# Patient Record
Sex: FEMALE | Race: BLACK OR AFRICAN AMERICAN | NOT HISPANIC OR LATINO | URBAN - METROPOLITAN AREA
[De-identification: names, ages, dates, MRNs, and addresses within clinical notes are randomized per-mention and may not be internally consistent; named-entity substitution may affect disease eponyms.]

---

## 2018-03-01 ENCOUNTER — OUTPATIENT (OUTPATIENT)
Dept: OUTPATIENT SERVICES | Facility: HOSPITAL | Age: 38
LOS: 1 days | Discharge: HOME | End: 2018-03-01

## 2018-03-01 DIAGNOSIS — N97.9 FEMALE INFERTILITY, UNSPECIFIED: ICD-10-CM

## 2018-06-14 ENCOUNTER — OUTPATIENT (OUTPATIENT)
Dept: OUTPATIENT SERVICES | Facility: HOSPITAL | Age: 38
LOS: 1 days | Discharge: HOME | End: 2018-06-14

## 2018-06-14 DIAGNOSIS — N97.9 FEMALE INFERTILITY, UNSPECIFIED: ICD-10-CM

## 2022-09-17 ENCOUNTER — EMERGENCY (EMERGENCY)
Facility: HOSPITAL | Age: 42
LOS: 0 days | Discharge: HOME | End: 2022-09-17
Attending: EMERGENCY MEDICINE | Admitting: EMERGENCY MEDICINE

## 2022-09-17 VITALS
SYSTOLIC BLOOD PRESSURE: 114 MMHG | DIASTOLIC BLOOD PRESSURE: 71 MMHG | OXYGEN SATURATION: 100 % | RESPIRATION RATE: 16 BRPM | HEART RATE: 81 BPM

## 2022-09-17 VITALS
HEART RATE: 92 BPM | OXYGEN SATURATION: 100 % | HEIGHT: 67 IN | WEIGHT: 149.91 LBS | RESPIRATION RATE: 18 BRPM | SYSTOLIC BLOOD PRESSURE: 124 MMHG | TEMPERATURE: 98 F | DIASTOLIC BLOOD PRESSURE: 94 MMHG

## 2022-09-17 DIAGNOSIS — R19.7 DIARRHEA, UNSPECIFIED: ICD-10-CM

## 2022-09-17 DIAGNOSIS — Z88.5 ALLERGY STATUS TO NARCOTIC AGENT: ICD-10-CM

## 2022-09-17 DIAGNOSIS — D25.9 LEIOMYOMA OF UTERUS, UNSPECIFIED: ICD-10-CM

## 2022-09-17 DIAGNOSIS — R10.30 LOWER ABDOMINAL PAIN, UNSPECIFIED: ICD-10-CM

## 2022-09-17 DIAGNOSIS — N83.202 UNSPECIFIED OVARIAN CYST, LEFT SIDE: ICD-10-CM

## 2022-09-17 DIAGNOSIS — Z91.018 ALLERGY TO OTHER FOODS: ICD-10-CM

## 2022-09-17 LAB
ALBUMIN SERPL ELPH-MCNC: 4.8 G/DL — SIGNIFICANT CHANGE UP (ref 3.5–5.2)
ALP SERPL-CCNC: 76 U/L — SIGNIFICANT CHANGE UP (ref 30–115)
ALT FLD-CCNC: 11 U/L — SIGNIFICANT CHANGE UP (ref 0–41)
ANION GAP SERPL CALC-SCNC: 10 MMOL/L — SIGNIFICANT CHANGE UP (ref 7–14)
APPEARANCE UR: CLEAR — SIGNIFICANT CHANGE UP
AST SERPL-CCNC: 22 U/L — SIGNIFICANT CHANGE UP (ref 0–41)
BASOPHILS # BLD AUTO: 0.02 K/UL — SIGNIFICANT CHANGE UP (ref 0–0.2)
BASOPHILS NFR BLD AUTO: 0.3 % — SIGNIFICANT CHANGE UP (ref 0–1)
BILIRUB SERPL-MCNC: <0.2 MG/DL — SIGNIFICANT CHANGE UP (ref 0.2–1.2)
BILIRUB UR-MCNC: NEGATIVE — SIGNIFICANT CHANGE UP
BUN SERPL-MCNC: 10 MG/DL — SIGNIFICANT CHANGE UP (ref 10–20)
CALCIUM SERPL-MCNC: 10.3 MG/DL — SIGNIFICANT CHANGE UP (ref 8.4–10.5)
CHLORIDE SERPL-SCNC: 102 MMOL/L — SIGNIFICANT CHANGE UP (ref 98–110)
CO2 SERPL-SCNC: 24 MMOL/L — SIGNIFICANT CHANGE UP (ref 17–32)
COLOR SPEC: YELLOW — SIGNIFICANT CHANGE UP
CREAT SERPL-MCNC: 0.8 MG/DL — SIGNIFICANT CHANGE UP (ref 0.7–1.5)
DIFF PNL FLD: NEGATIVE — SIGNIFICANT CHANGE UP
EGFR: 95 ML/MIN/1.73M2 — SIGNIFICANT CHANGE UP
EOSINOPHIL # BLD AUTO: 0.09 K/UL — SIGNIFICANT CHANGE UP (ref 0–0.7)
EOSINOPHIL NFR BLD AUTO: 1.4 % — SIGNIFICANT CHANGE UP (ref 0–8)
GLUCOSE SERPL-MCNC: 89 MG/DL — SIGNIFICANT CHANGE UP (ref 70–99)
GLUCOSE UR QL: NEGATIVE MG/DL — SIGNIFICANT CHANGE UP
HCG SERPL QL: NEGATIVE — SIGNIFICANT CHANGE UP
HCT VFR BLD CALC: 40.7 % — SIGNIFICANT CHANGE UP (ref 37–47)
HGB BLD-MCNC: 13.3 G/DL — SIGNIFICANT CHANGE UP (ref 12–16)
IMM GRANULOCYTES NFR BLD AUTO: 0.2 % — SIGNIFICANT CHANGE UP (ref 0.1–0.3)
KETONES UR-MCNC: NEGATIVE — SIGNIFICANT CHANGE UP
LACTATE SERPL-SCNC: 0.9 MMOL/L — SIGNIFICANT CHANGE UP (ref 0.7–2)
LEUKOCYTE ESTERASE UR-ACNC: NEGATIVE — SIGNIFICANT CHANGE UP
LG PLATELETS BLD QL AUTO: SLIGHT — SIGNIFICANT CHANGE UP
LIDOCAIN IGE QN: 56 U/L — SIGNIFICANT CHANGE UP (ref 7–60)
LYMPHOCYTES # BLD AUTO: 1.62 K/UL — SIGNIFICANT CHANGE UP (ref 1.2–3.4)
LYMPHOCYTES # BLD AUTO: 25.6 % — SIGNIFICANT CHANGE UP (ref 20.5–51.1)
MCHC RBC-ENTMCNC: 28.2 PG — SIGNIFICANT CHANGE UP (ref 27–31)
MCHC RBC-ENTMCNC: 32.7 G/DL — SIGNIFICANT CHANGE UP (ref 32–37)
MCV RBC AUTO: 86.4 FL — SIGNIFICANT CHANGE UP (ref 81–99)
MONOCYTES # BLD AUTO: 0.63 K/UL — HIGH (ref 0.1–0.6)
MONOCYTES NFR BLD AUTO: 10 % — HIGH (ref 1.7–9.3)
NEUTROPHILS # BLD AUTO: 3.96 K/UL — SIGNIFICANT CHANGE UP (ref 1.4–6.5)
NEUTROPHILS NFR BLD AUTO: 62.5 % — SIGNIFICANT CHANGE UP (ref 42.2–75.2)
NEUTS BAND # BLD: 1 % — SIGNIFICANT CHANGE UP (ref 0–6)
NITRITE UR-MCNC: NEGATIVE — SIGNIFICANT CHANGE UP
NRBC # BLD: 0 /100 WBCS — SIGNIFICANT CHANGE UP (ref 0–0)
NRBC # BLD: 0 /100 — SIGNIFICANT CHANGE UP (ref 0–0)
PH UR: 6 — SIGNIFICANT CHANGE UP (ref 5–8)
PLAT MORPH BLD: NORMAL — SIGNIFICANT CHANGE UP
PLATELET # BLD AUTO: 311 K/UL — SIGNIFICANT CHANGE UP (ref 130–400)
POTASSIUM SERPL-MCNC: 5.2 MMOL/L — HIGH (ref 3.5–5)
POTASSIUM SERPL-SCNC: 5.2 MMOL/L — HIGH (ref 3.5–5)
PROT SERPL-MCNC: 8.2 G/DL — HIGH (ref 6–8)
PROT UR-MCNC: NEGATIVE MG/DL — SIGNIFICANT CHANGE UP
RBC # BLD: 4.71 M/UL — SIGNIFICANT CHANGE UP (ref 4.2–5.4)
RBC # FLD: 13.3 % — SIGNIFICANT CHANGE UP (ref 11.5–14.5)
RBC BLD AUTO: NORMAL — SIGNIFICANT CHANGE UP
SODIUM SERPL-SCNC: 136 MMOL/L — SIGNIFICANT CHANGE UP (ref 135–146)
SP GR SPEC: 1.02 — SIGNIFICANT CHANGE UP (ref 1.01–1.03)
SPHEROCYTES BLD QL SMEAR: SLIGHT — SIGNIFICANT CHANGE UP
UROBILINOGEN FLD QL: 0.2 MG/DL — SIGNIFICANT CHANGE UP
VARIANT LYMPHS # BLD: 6 % — HIGH (ref 0–5)
WBC # BLD: 6.33 K/UL — SIGNIFICANT CHANGE UP (ref 4.8–10.8)
WBC # FLD AUTO: 6.33 K/UL — SIGNIFICANT CHANGE UP (ref 4.8–10.8)

## 2022-09-17 PROCEDURE — 74177 CT ABD & PELVIS W/CONTRAST: CPT | Mod: 26,MA

## 2022-09-17 PROCEDURE — 76830 TRANSVAGINAL US NON-OB: CPT | Mod: 26

## 2022-09-17 PROCEDURE — 99285 EMERGENCY DEPT VISIT HI MDM: CPT

## 2022-09-17 PROCEDURE — 71045 X-RAY EXAM CHEST 1 VIEW: CPT | Mod: 26

## 2022-09-17 RX ORDER — ONDANSETRON 8 MG/1
4 TABLET, FILM COATED ORAL ONCE
Refills: 0 | Status: COMPLETED | OUTPATIENT
Start: 2022-09-17 | End: 2022-09-17

## 2022-09-17 RX ORDER — SODIUM CHLORIDE 9 MG/ML
1000 INJECTION INTRAMUSCULAR; INTRAVENOUS; SUBCUTANEOUS ONCE
Refills: 0 | Status: COMPLETED | OUTPATIENT
Start: 2022-09-17 | End: 2022-09-17

## 2022-09-17 RX ADMIN — ONDANSETRON 4 MILLIGRAM(S): 8 TABLET, FILM COATED ORAL at 16:54

## 2022-09-17 RX ADMIN — SODIUM CHLORIDE 1000 MILLILITER(S): 9 INJECTION INTRAMUSCULAR; INTRAVENOUS; SUBCUTANEOUS at 16:54

## 2022-09-17 NOTE — ED PROVIDER NOTE - NS ED ATTENDING STATEMENT MOD
This was a shared visit with the KRAIG. I reviewed and verified the documentation and independently performed the documented:

## 2022-09-17 NOTE — ED PROVIDER NOTE - ATTENDING APP SHARED VISIT CONTRIBUTION OF CARE
42-year-old female with history of for evaluation of lower abdominal pain/cramping.  Patient states that symptoms are associated with diarrhea for the past 4 days.  Took Pepto, now also noticing black stools.  Went to urgent care, sent in for further evaluation.  Well appearing, NAD, non toxic. NCAT PERRLA EOMI neck supple non tender normal wob cta bl rrr abdomen s mild diffuse tenderness palpation nd no rebound no guarding WWPx4 neuro non focal

## 2022-09-17 NOTE — ED PROVIDER NOTE - NSFOLLOWUPINSTRUCTIONS_ED_ALL_ED_FT
Diarrhea    Diarrhea is frequent loose and watery bowel movements that has many causes. Diarrhea can make you feel weak and cause you to become dehydrated. Diarrhea typically lasts 2–3 days. However, it can last longer if it is a sign of something more serious. Drink clear fluids to prevent dehydration. Eat bland, easy-to-digest foods as tolerated.     SEEK IMMEDIATE MEDICAL CARE IF YOU HAVE THE FOLLOWING SYMPTOMS: fever, lightheadedness/dizziness, chest pain, black or bloody stools, shortness of breath, severe abdominal or back pain, or any signs of dehydration.     Ovarian Cyst    An ovarian cyst is a fluid-filled sac that forms on an ovary. The ovaries are small organs that produce eggs in women. Various types of cysts can form on the ovaries. Most are not cancerous. Many do not cause problems, and they often go away on their own. Some may cause symptoms and require treatment. Common types of ovarian cysts include:     Functional cysts—These cysts may occur every month during the menstrual cycle. This is normal. The cysts usually go away with the next menstrual cycle if the woman does not get pregnant. Usually, there are no symptoms with a functional cyst.  Endometrioma cysts—These cysts form from the tissue that lines the uterus. They are also called "chocolate cysts" because they become filled with blood that turns brown. This type of cyst can cause pain in the lower abdomen during intercourse and with your menstrual period.  Cystadenoma cysts—This type develops from the cells on the outside of the ovary. These cysts can get very big and cause lower abdomen pain and pain with intercourse. This type of cyst can twist on itself, cut off its blood supply, and cause severe pain. It can also easily rupture and cause a lot of pain.  Dermoid cysts—This type of cyst is sometimes found in both ovaries. These cysts may contain different kinds of body tissue, such as skin, teeth, hair, or cartilage. They usually do not cause symptoms unless they get very big.   Theca lutein cysts—These cysts occur when too much of a certain hormone (human chorionic gonadotropin) is produced and overstimulates the ovaries to produce an egg. This is most common after procedures used to assist with the conception of a baby (in vitro fertilization).    CAUSES  Fertility drugs can cause a condition in which multiple large cysts are formed on the ovaries. This is called ovarian hyperstimulation syndrome.   A condition called polycystic ovary syndrome can cause hormonal imbalances that can lead to nonfunctional ovarian cysts.     SIGNS AND SYMPTOMS  Many ovarian cysts do not cause symptoms. If symptoms are present, they may include:    Pelvic pain or pressure.  Pain in the lower abdomen.  Pain during sexual intercourse.  Increasing girth (swelling) of the abdomen.  Abnormal menstrual periods.  Increasing pain with menstrual periods.  Stopping having menstrual periods without being pregnant.    DIAGNOSIS  These cysts are commonly found during a routine or annual pelvic exam. Tests may be ordered to find out more about the cyst. These tests may include:    Ultrasound.  X-ray of the pelvis.  CT scan.  MRI.  Blood tests.    TREATMENT  Many ovarian cysts go away on their own without treatment. Your health care provider may want to check your cyst regularly for 2–3 months to see if it changes. For women in menopause, it is particularly important to monitor a cyst closely because of the higher rate of ovarian cancer in menopausal women. When treatment is needed, it may include any of the following:    A procedure to drain the cyst (aspiration). This may be done using a long needle and ultrasound. It can also be done through a laparoscopic procedure. This involves using a thin, lighted tube with a tiny camera on the end (laparoscope) inserted through a small incision.   Surgery to remove the whole cyst. This may be done using laparoscopic surgery or an open surgery involving a larger incision in the lower abdomen.   Hormone treatment or birth control pills. These methods are sometimes used to help dissolve a cyst.    HOME CARE INSTRUCTIONS  Only take over-the-counter or prescription medicines as directed by your health care provider.   Follow up with your health care provider as directed.   Get regular pelvic exams and Pap tests.    SEEK MEDICAL CARE IF:  Your periods are late, irregular, or painful, or they stop.  Your pelvic pain or abdominal pain does not go away.  Your abdomen becomes larger or swollen.  You have pressure on your bladder or trouble emptying your bladder completely.  You have pain during sexual intercourse.  You have feelings of fullness, pressure, or discomfort in your stomach.  You lose weight for no apparent reason.  You feel generally ill.  You become constipated.  You lose your appetite.  You develop acne.  You have an increase in body and facial hair.  You are gaining weight, without changing your exercise and eating habits.  You think you are pregnant.    SEEK IMMEDIATE MEDICAL CARE IF:  You have increasing abdominal pain.  You feel sick to your stomach (nauseous), and you throw up (vomit).  You develop a fever that comes on suddenly.  You have abdominal pain during a bowel movement.  Your menstrual periods become heavier than usual.

## 2022-09-17 NOTE — ED PROVIDER NOTE - OBJECTIVE STATEMENT
43 yo F pmhx uterine fibroids presenting to the ED for evaluation of lower abdominal cramping discomfort with diarrhea x 4 days. pt reports she was taking pepto bismol with minimal relief, reporting dark stools since taking pepto bismol. pt states symptoms are intermittent sharp. denies any modifying factors. denies fever, chills, brbpr, nausea, vomiting, chest pain, sob, dysuria, hematuria, flank pain.

## 2022-09-17 NOTE — ED PROVIDER NOTE - PATIENT PORTAL LINK FT
You can access the FollowMyHealth Patient Portal offered by [facility] by registering at the following website: [email or web address]. By joining [facility]’s FollowMyHealth portal, you will also be able to view your health information using other applications (apps) compatible with our system.

## 2022-09-17 NOTE — ED PROVIDER NOTE - PROGRESS NOTE DETAILS
Patient is aware of her enlarged ovary with ovarian cyst.  Patient is having further work-up with her gynecologist on Tuesday.

## 2022-09-17 NOTE — ED PROVIDER NOTE - CLINICAL SUMMARY MEDICAL DECISION MAKING FREE TEXT BOX
Pt evaluated after sign out. Comfortable. CT/US reviewed. Pt has no pain right now. No concern for torsion. Will d/c with PMD/GYN follow up.

## 2022-09-17 NOTE — ED PROVIDER NOTE - PHYSICAL EXAMINATION
GENERAL: Well-nourished, Well-developed. NAD.  HEAD: No visible or palpable bumps or hematomas. No ecchymosis behind ears B/L.  Eyes: PERRLA, EOMI. No asymmetry. No nystagmus. No conjunctival injection. Non-icteric sclera.  ENMT: MMM.   Neck: Supple. FROM  CVS: No JVD. No reproducible chest wall tenderness. Normal S1,S2. No murmurs appreciated on auscultation   RESP: No use of accessory muscles. Chest rise symmetrical with good expansion. Lungs clear to auscultation B/L. No wheezing, rales, or rhonchi auscultated.  GI: Normal auscultation of bowel sounds in all 4 quadrants. (+)mild lower abd ttp. Soft, Nondistended. No guarding or rebound tenderness. No CVAT B/L.  Skin: Warm, Dry. No rashes or lesions. Good cap refill < 2 sec B/L.  EXT: Radial and pedal pulses present B/L. No calf tenderness or swelling B/L. No palpable cords. No pedal edema B/L.

## 2022-09-19 LAB
CULTURE RESULTS: SIGNIFICANT CHANGE UP
SPECIMEN SOURCE: SIGNIFICANT CHANGE UP

## 2023-08-03 NOTE — ED PROVIDER NOTE - NS ED ROS FT
PATIENT INFORMATION    Anticipatory guidance discussed  Bicycle helmets  Chores and other responsibilities  Discipline issues: limit-setting, positive reinforcement  Fluoride supplementation if unfluoridated water supply  Importance of regular dental care  Importance of regular exercise  Importance of varied diet  Library card; limit TV, media violence  Minimize junk food  Seat belts; don't put in front seat  Skim or lowfat milk best  Smoke detectors; home fire drills  Teach child how to deal with strangers  Teaching pedestrian safety    Follow-Up  - Return for your yearly well child visit.    7-8 years old Health and Safety Tips - The following hyperlinks are available to access via [employer]    Parent Education from [employer]    Educación para padres sobre niños sanos    Additional Educational Resources:  For additional resources regarding your symptoms, diagnosis, or further health information, please visit the Discover a Healthier You section on [email or web address] or the Online Health Resources section in [employer].  
Constitutional: No fever, chills.  Eyes:  No visual changes  ENMT:  No neck pain  Cardiac:  No chest pain  Respiratory:  No cough, SOB  GI:  (+)abdominal pain and diarrhea. No nausea, vomiting  :  No dysuria, hematuria  MS:  No back pain.  Neuro:  No headache or lightheadedness  Skin:  No skin rash  Endocrine: No history of thyroid disease or diabetes.  Except as documented in the HPI,  all other systems are negative.

## 2025-03-16 ENCOUNTER — INPATIENT (INPATIENT)
Facility: HOSPITAL | Age: 45
LOS: 7 days | Discharge: HOME | DRG: 195 | End: 2025-03-24
Attending: STUDENT IN AN ORGANIZED HEALTH CARE EDUCATION/TRAINING PROGRAM | Admitting: STUDENT IN AN ORGANIZED HEALTH CARE EDUCATION/TRAINING PROGRAM
Payer: COMMERCIAL

## 2025-03-16 VITALS
TEMPERATURE: 98 F | DIASTOLIC BLOOD PRESSURE: 95 MMHG | HEART RATE: 112 BPM | RESPIRATION RATE: 18 BRPM | SYSTOLIC BLOOD PRESSURE: 148 MMHG | OXYGEN SATURATION: 99 %

## 2025-03-16 DIAGNOSIS — J18.9 PNEUMONIA, UNSPECIFIED ORGANISM: ICD-10-CM

## 2025-03-16 LAB
ALBUMIN SERPL ELPH-MCNC: 4.1 G/DL — SIGNIFICANT CHANGE UP (ref 3.5–5.2)
ALBUMIN SERPL ELPH-MCNC: 4.2 G/DL — SIGNIFICANT CHANGE UP (ref 3.5–5.2)
ALP SERPL-CCNC: 73 U/L — SIGNIFICANT CHANGE UP (ref 30–115)
ALP SERPL-CCNC: 80 U/L — SIGNIFICANT CHANGE UP (ref 30–115)
ALT FLD-CCNC: 18 U/L — SIGNIFICANT CHANGE UP (ref 0–41)
ALT FLD-CCNC: 21 U/L — SIGNIFICANT CHANGE UP (ref 0–41)
ANION GAP SERPL CALC-SCNC: 11 MMOL/L — SIGNIFICANT CHANGE UP (ref 7–14)
ANION GAP SERPL CALC-SCNC: 12 MMOL/L — SIGNIFICANT CHANGE UP (ref 7–14)
AST SERPL-CCNC: 21 U/L — SIGNIFICANT CHANGE UP (ref 0–41)
AST SERPL-CCNC: 42 U/L — HIGH (ref 0–41)
BASOPHILS # BLD AUTO: 0.04 K/UL — SIGNIFICANT CHANGE UP (ref 0–0.2)
BASOPHILS NFR BLD AUTO: 0.5 % — SIGNIFICANT CHANGE UP (ref 0–1)
BILIRUB SERPL-MCNC: 0.4 MG/DL — SIGNIFICANT CHANGE UP (ref 0.2–1.2)
BILIRUB SERPL-MCNC: 0.5 MG/DL — SIGNIFICANT CHANGE UP (ref 0.2–1.2)
BUN SERPL-MCNC: 12 MG/DL — SIGNIFICANT CHANGE UP (ref 10–20)
BUN SERPL-MCNC: 13 MG/DL — SIGNIFICANT CHANGE UP (ref 10–20)
CALCIUM SERPL-MCNC: 9.1 MG/DL — SIGNIFICANT CHANGE UP (ref 8.4–10.5)
CALCIUM SERPL-MCNC: 9.4 MG/DL — SIGNIFICANT CHANGE UP (ref 8.4–10.5)
CHLORIDE SERPL-SCNC: 104 MMOL/L — SIGNIFICANT CHANGE UP (ref 98–110)
CHLORIDE SERPL-SCNC: 106 MMOL/L — SIGNIFICANT CHANGE UP (ref 98–110)
CO2 SERPL-SCNC: 22 MMOL/L — SIGNIFICANT CHANGE UP (ref 17–32)
CO2 SERPL-SCNC: 22 MMOL/L — SIGNIFICANT CHANGE UP (ref 17–32)
CREAT SERPL-MCNC: 0.8 MG/DL — SIGNIFICANT CHANGE UP (ref 0.7–1.5)
CREAT SERPL-MCNC: 0.8 MG/DL — SIGNIFICANT CHANGE UP (ref 0.7–1.5)
EGFR: 94 ML/MIN/1.73M2 — SIGNIFICANT CHANGE UP
EOSINOPHIL # BLD AUTO: 0.07 K/UL — SIGNIFICANT CHANGE UP (ref 0–0.7)
EOSINOPHIL NFR BLD AUTO: 0.9 % — SIGNIFICANT CHANGE UP (ref 0–8)
GLUCOSE SERPL-MCNC: 107 MG/DL — HIGH (ref 70–99)
GLUCOSE SERPL-MCNC: 108 MG/DL — HIGH (ref 70–99)
HCG SERPL QL: NEGATIVE — SIGNIFICANT CHANGE UP
HCT VFR BLD CALC: 37.4 % — SIGNIFICANT CHANGE UP (ref 37–47)
HGB BLD-MCNC: 12.3 G/DL — SIGNIFICANT CHANGE UP (ref 12–16)
IMM GRANULOCYTES NFR BLD AUTO: 0.3 % — SIGNIFICANT CHANGE UP (ref 0.1–0.3)
LYMPHOCYTES # BLD AUTO: 1.45 K/UL — SIGNIFICANT CHANGE UP (ref 1.2–3.4)
LYMPHOCYTES # BLD AUTO: 19.2 % — LOW (ref 20.5–51.1)
MAGNESIUM SERPL-MCNC: 2.2 MG/DL — SIGNIFICANT CHANGE UP (ref 1.8–2.4)
MCHC RBC-ENTMCNC: 28.7 PG — SIGNIFICANT CHANGE UP (ref 27–31)
MCHC RBC-ENTMCNC: 32.9 G/DL — SIGNIFICANT CHANGE UP (ref 32–37)
MCV RBC AUTO: 87.4 FL — SIGNIFICANT CHANGE UP (ref 81–99)
MONOCYTES # BLD AUTO: 0.48 K/UL — SIGNIFICANT CHANGE UP (ref 0.1–0.6)
MONOCYTES NFR BLD AUTO: 6.3 % — SIGNIFICANT CHANGE UP (ref 1.7–9.3)
MRSA PCR RESULT.: NEGATIVE — SIGNIFICANT CHANGE UP
NEUTROPHILS # BLD AUTO: 5.5 K/UL — SIGNIFICANT CHANGE UP (ref 1.4–6.5)
NEUTROPHILS NFR BLD AUTO: 72.8 % — SIGNIFICANT CHANGE UP (ref 42.2–75.2)
NRBC BLD AUTO-RTO: 0 /100 WBCS — SIGNIFICANT CHANGE UP (ref 0–0)
NT-PROBNP SERPL-SCNC: 2059 PG/ML — HIGH (ref 0–300)
PLATELET # BLD AUTO: 211 K/UL — SIGNIFICANT CHANGE UP (ref 130–400)
PMV BLD: 11.5 FL — HIGH (ref 7.4–10.4)
POTASSIUM SERPL-MCNC: 4.2 MMOL/L — SIGNIFICANT CHANGE UP (ref 3.5–5)
POTASSIUM SERPL-MCNC: SIGNIFICANT CHANGE UP MMOL/L (ref 3.5–5)
POTASSIUM SERPL-SCNC: 4.2 MMOL/L — SIGNIFICANT CHANGE UP (ref 3.5–5)
POTASSIUM SERPL-SCNC: SIGNIFICANT CHANGE UP MMOL/L (ref 3.5–5)
PROT SERPL-MCNC: 7.1 G/DL — SIGNIFICANT CHANGE UP (ref 6–8)
PROT SERPL-MCNC: 7.3 G/DL — SIGNIFICANT CHANGE UP (ref 6–8)
RBC # BLD: 4.28 M/UL — SIGNIFICANT CHANGE UP (ref 4.2–5.4)
RBC # FLD: 13.9 % — SIGNIFICANT CHANGE UP (ref 11.5–14.5)
SODIUM SERPL-SCNC: 137 MMOL/L — SIGNIFICANT CHANGE UP (ref 135–146)
SODIUM SERPL-SCNC: 140 MMOL/L — SIGNIFICANT CHANGE UP (ref 135–146)
TROPONIN T, HIGH SENSITIVITY RESULT: <6 NG/L — SIGNIFICANT CHANGE UP (ref 6–13)
WBC # BLD: 7.56 K/UL — SIGNIFICANT CHANGE UP (ref 4.8–10.8)
WBC # FLD AUTO: 7.56 K/UL — SIGNIFICANT CHANGE UP (ref 4.8–10.8)

## 2025-03-16 PROCEDURE — 85025 COMPLETE CBC W/AUTO DIFF WBC: CPT

## 2025-03-16 PROCEDURE — 71275 CT ANGIOGRAPHY CHEST: CPT | Mod: 26

## 2025-03-16 PROCEDURE — 86753 PROTOZOA ANTIBODY NOS: CPT

## 2025-03-16 PROCEDURE — 86140 C-REACTIVE PROTEIN: CPT

## 2025-03-16 PROCEDURE — A9579: CPT

## 2025-03-16 PROCEDURE — 84100 ASSAY OF PHOSPHORUS: CPT

## 2025-03-16 PROCEDURE — 80354 DRUG SCREENING FENTANYL: CPT

## 2025-03-16 PROCEDURE — 87040 BLOOD CULTURE FOR BACTERIA: CPT

## 2025-03-16 PROCEDURE — A9500: CPT

## 2025-03-16 PROCEDURE — 85027 COMPLETE CBC AUTOMATED: CPT

## 2025-03-16 PROCEDURE — 87640 STAPH A DNA AMP PROBE: CPT

## 2025-03-16 PROCEDURE — 71046 X-RAY EXAM CHEST 2 VIEWS: CPT | Mod: 26

## 2025-03-16 PROCEDURE — 99285 EMERGENCY DEPT VISIT HI MDM: CPT

## 2025-03-16 PROCEDURE — 83735 ASSAY OF MAGNESIUM: CPT

## 2025-03-16 PROCEDURE — 82746 ASSAY OF FOLIC ACID SERUM: CPT

## 2025-03-16 PROCEDURE — 85652 RBC SED RATE AUTOMATED: CPT

## 2025-03-16 PROCEDURE — 75561 CARDIAC MRI FOR MORPH W/DYE: CPT

## 2025-03-16 PROCEDURE — 87899 AGENT NOS ASSAY W/OPTIC: CPT

## 2025-03-16 PROCEDURE — 36415 COLL VENOUS BLD VENIPUNCTURE: CPT

## 2025-03-16 PROCEDURE — 84443 ASSAY THYROID STIM HORMONE: CPT

## 2025-03-16 PROCEDURE — 84439 ASSAY OF FREE THYROXINE: CPT

## 2025-03-16 PROCEDURE — 93010 ELECTROCARDIOGRAM REPORT: CPT

## 2025-03-16 PROCEDURE — 93306 TTE W/DOPPLER COMPLETE: CPT

## 2025-03-16 PROCEDURE — 87641 MR-STAPH DNA AMP PROBE: CPT

## 2025-03-16 PROCEDURE — 86658 ENTEROVIRUS ANTIBODY: CPT

## 2025-03-16 PROCEDURE — 87799 DETECT AGENT NOS DNA QUANT: CPT

## 2025-03-16 PROCEDURE — 80053 COMPREHEN METABOLIC PANEL: CPT

## 2025-03-16 PROCEDURE — 99222 1ST HOSP IP/OBS MODERATE 55: CPT

## 2025-03-16 PROCEDURE — 87449 NOS EACH ORGANISM AG IA: CPT

## 2025-03-16 PROCEDURE — 80048 BASIC METABOLIC PNL TOTAL CA: CPT

## 2025-03-16 PROCEDURE — 87389 HIV-1 AG W/HIV-1&-2 AB AG IA: CPT

## 2025-03-16 PROCEDURE — 78452 HT MUSCLE IMAGE SPECT MULT: CPT | Mod: MC

## 2025-03-16 PROCEDURE — 80307 DRUG TEST PRSMV CHEM ANLYZR: CPT

## 2025-03-16 RX ORDER — FUROSEMIDE 10 MG/ML
40 INJECTION INTRAMUSCULAR; INTRAVENOUS
Refills: 0 | Status: DISCONTINUED | OUTPATIENT
Start: 2025-03-16 | End: 2025-03-16

## 2025-03-16 RX ORDER — FUROSEMIDE 10 MG/ML
20 INJECTION INTRAMUSCULAR; INTRAVENOUS ONCE
Refills: 0 | Status: COMPLETED | OUTPATIENT
Start: 2025-03-16 | End: 2025-03-16

## 2025-03-16 RX ORDER — FUROSEMIDE 10 MG/ML
40 INJECTION INTRAMUSCULAR; INTRAVENOUS DAILY
Refills: 0 | Status: DISCONTINUED | OUTPATIENT
Start: 2025-03-16 | End: 2025-03-18

## 2025-03-16 RX ORDER — INFLUENZA A VIRUS A/IDAHO/07/2018 (H1N1) ANTIGEN (MDCK CELL DERIVED, PROPIOLACTONE INACTIVATED, INFLUENZA A VIRUS A/INDIANA/08/2018 (H3N2) ANTIGEN (MDCK CELL DERIVED, PROPIOLACTONE INACTIVATED), INFLUENZA B VIRUS B/SINGAPORE/INFTT-16-0610/2016 ANTIGEN (MDCK CELL DERIVED, PROPIOLACTONE INACTIVATED), INFLUENZA B VIRUS B/IOWA/06/2017 ANTIGEN (MDCK CELL DERIVED, PROPIOLACTONE INACTIVATED) 15; 15; 15; 15 UG/.5ML; UG/.5ML; UG/.5ML; UG/.5ML
0.5 INJECTION, SUSPENSION INTRAMUSCULAR ONCE
Refills: 0 | Status: COMPLETED | OUTPATIENT
Start: 2025-03-16 | End: 2025-03-16

## 2025-03-16 RX ORDER — ACETAMINOPHEN 500 MG/5ML
650 LIQUID (ML) ORAL EVERY 6 HOURS
Refills: 0 | Status: DISCONTINUED | OUTPATIENT
Start: 2025-03-16 | End: 2025-03-24

## 2025-03-16 RX ORDER — FOLIC ACID 1 MG/1
1 TABLET ORAL DAILY
Refills: 0 | Status: DISCONTINUED | OUTPATIENT
Start: 2025-03-16 | End: 2025-03-24

## 2025-03-16 RX ORDER — ACETAMINOPHEN 500 MG/5ML
650 LIQUID (ML) ORAL ONCE
Refills: 0 | Status: COMPLETED | OUTPATIENT
Start: 2025-03-16 | End: 2025-03-16

## 2025-03-16 RX ORDER — ENOXAPARIN SODIUM 100 MG/ML
40 INJECTION SUBCUTANEOUS EVERY 24 HOURS
Refills: 0 | Status: DISCONTINUED | OUTPATIENT
Start: 2025-03-16 | End: 2025-03-24

## 2025-03-16 RX ORDER — CEFTRIAXONE 500 MG/1
1000 INJECTION, POWDER, FOR SOLUTION INTRAMUSCULAR; INTRAVENOUS ONCE
Refills: 0 | Status: COMPLETED | OUTPATIENT
Start: 2025-03-16 | End: 2025-03-16

## 2025-03-16 RX ORDER — IBUPROFEN 200 MG
400 TABLET ORAL ONCE
Refills: 0 | Status: COMPLETED | OUTPATIENT
Start: 2025-03-16 | End: 2025-03-16

## 2025-03-16 RX ORDER — AZITHROMYCIN 250 MG
500 CAPSULE ORAL ONCE
Refills: 0 | Status: COMPLETED | OUTPATIENT
Start: 2025-03-16 | End: 2025-03-16

## 2025-03-16 RX ORDER — AZITHROMYCIN 250 MG
500 CAPSULE ORAL EVERY 24 HOURS
Refills: 0 | Status: DISCONTINUED | OUTPATIENT
Start: 2025-03-16 | End: 2025-03-19

## 2025-03-16 RX ORDER — CEFTRIAXONE 500 MG/1
1000 INJECTION, POWDER, FOR SOLUTION INTRAMUSCULAR; INTRAVENOUS EVERY 24 HOURS
Refills: 0 | Status: DISCONTINUED | OUTPATIENT
Start: 2025-03-16 | End: 2025-03-18

## 2025-03-16 RX ADMIN — Medication 250 MILLIGRAM(S): at 15:30

## 2025-03-16 RX ADMIN — CEFTRIAXONE 100 MILLIGRAM(S): 500 INJECTION, POWDER, FOR SOLUTION INTRAMUSCULAR; INTRAVENOUS at 15:30

## 2025-03-16 RX ADMIN — Medication 650 MILLIGRAM(S): at 19:53

## 2025-03-16 RX ADMIN — Medication 400 MILLIGRAM(S): at 22:21

## 2025-03-16 RX ADMIN — FUROSEMIDE 20 MILLIGRAM(S): 10 INJECTION INTRAMUSCULAR; INTRAVENOUS at 15:30

## 2025-03-16 RX ADMIN — Medication 650 MILLIGRAM(S): at 19:13

## 2025-03-16 RX ADMIN — ENOXAPARIN SODIUM 40 MILLIGRAM(S): 100 INJECTION SUBCUTANEOUS at 21:37

## 2025-03-16 RX ADMIN — Medication 400 MILLIGRAM(S): at 22:52

## 2025-03-16 NOTE — ED ADULT NURSE NOTE - NSICDXFAMILYHX_GEN_ALL_CORE_FT
FAMILY HISTORY:  Mother  Still living? Unknown  Family history of CHF (congestive heart failure), Age at diagnosis: Age Unknown    Grandparent  Still living? Yes, Estimated age: Age Unknown  Family history of CHF (congestive heart failure), Age at diagnosis: Age Unknown

## 2025-03-16 NOTE — ED PROVIDER NOTE - OBJECTIVE STATEMENT
. Patient is a 44-year-old female presents for evaluation of chest pain shortness of breath particularly when she lays down onset over the past 2 to 3 days denies any fevers chills denies any cough patient states that it feels like pressure

## 2025-03-16 NOTE — H&P ADULT - TIME BILLING
Direct patient care including reviewing results and records, assessing patient, discussing with specialist

## 2025-03-16 NOTE — ED ADULT TRIAGE NOTE - AVIAN FLU SYMPTOMS
04/03/24                            Willie Sheikh  [address]  [facility] [address]    To Whom It May Concern:    This is to certify Willie Sheikh was evaluated with Alejandra Farrell MD on 04/03/24 and can return to regular work on 04/05/2024, if feeling better..     RESTRICTIONS: none            Electronically signed by:  Alejandra Farrell MD  [facility]  [address]  [facility] [address]  Dept Phone: [phone]        No

## 2025-03-16 NOTE — H&P ADULT - NSHPSOCIALHISTORY_GEN_ALL_CORE
Substance Use (street drugs): ( x ) never used  (  ) other:  Tobacco Usage:  ( x  ) never smoked   (   ) former smoker   (   ) current smoker  (     ) pack year  Alcohol Usage: None Substance Use (street drugs): ( x ) never used  (  ) other:  Tobacco Usage:  ( x  ) never smoked   (   ) former smoker   (   ) current smoker  (     ) pack year  Alcohol Usage: (+) drinks 2 shots of vodka daily

## 2025-03-16 NOTE — ED PROVIDER NOTE - ATTENDING APP SHARED VISIT CONTRIBUTION OF CARE
I have personally performed a history and physical exam on this patient and personally directed the management of the patient. Patient is a 44-year-old female presents for evaluation of chest pain shortness of breath particularly when she lays down onset over the past 2 to 3 days denies any fevers chills denies any cough patient states that it feels like pressure    On physical exam patient is normocephalic atraumatic pupils equal round reactive to light oropharynx clear chest clear to auscultation bilaterally abdomen soft nontender nondistended bowel sounds positive no guarding rib extremities full range of motion no focal deficits noted    Assessment plan patient presents for evaluation of shortness of breath chest pain we obtain EKG associated with STEMI not associated with arrhythmia denies we obtained a chest x-ray per my independent evaluation not consistent with ptx possible pneumonia given history I am more concerned with her history and symptoms less likely infectious but I will administer IV antibiotics patient also admitted for further evaluation.  CT: Follow

## 2025-03-16 NOTE — H&P ADULT - HISTORY OF PRESENT ILLNESS
Patient is a 45 y/o F with no significant pmhx who presents complaining of a 4 day hx of orthopnea and dyspnea on exertion associated with chest pain during inhalations. States symptoms have been progressively worsening prompting evaluation to the ER. Patient denies recent travel, sick contacts, fever, chills, n/v/d, urinary or bowel complaints. Of note, patient reports to drinking 2 shots of vodka nightly. No signs of withdrawals. Patient denies feeling anxious or restless.  Patient is a 45 y/o F with no significant pmhx who presents complaining of a 4 day hx of orthopnea associated with chest pain while taking deep breaths, non productive cough and myalgias. Denies sick contacts.  States symptoms have been progressively worsening prompting evaluation to the ER. Patient denies recent travel, sick contacts, fever, chills, n/v/d, urinary or bowel complaints. Of note, patient reports to drinking 2 shots of vodka nightly. No signs of withdrawals. Patient denies feeling anxious or restless.  Patient is a 43 y/o F with no significant pmhx who presents complaining of a 4 day hx of orthopnea associated with chest pain while taking deep breaths, non productive cough and myalgias. She states her chest tightness and orthopnea began all of a sudden 4 days ago.  She at first though it was related to her occupation, which is a .  She has been sleeping at an elevated angle due to her symptoms.  Denies sick contacts.  States symptoms have been progressively worsening prompting evaluation to the ER. She's had a mild cough for the past several months, which she took over the counter medication for without much improvement.  Patient denies recent travel, sick contacts, fever, chills, n/v/d, urinary or bowel complaints, tick bites. Of note, patient reports to drinking 2-3 shots of vodka nightly. No signs of withdrawals, has never had withdrawal symptoms before. Patient denies feeling anxious or restless.    Her mother has chronic systolic congestive heart failure diagnosed in her 60s.  Her maternal grandfather also has cardiac history, unknown type.  She is not in contact with her father and unaware of his health status.    In ED, PBNP elevated at 2059.  CTA rule out PE but found small bilateral pleural effusions.  She received lasix 40mg x1, with mild improvement in her symptoms. Patient is a 45 y/o F with PMH of uterine fibroids who presents complaining of a 4 day hx of orthopnea associated with chest pain while taking deep breaths, non productive cough and myalgias. She states her chest tightness and orthopnea began all of a sudden 4 days ago.  She at first though it was related to her occupation, which is a .  She has been sleeping at an elevated angle due to her symptoms.  Denies sick contacts.  States symptoms have been progressively worsening prompting evaluation to the ER. She's had a mild cough for the past several months, which she took over the counter medication for without much improvement.  Patient denies recent travel, sick contacts, fever, chills, n/v/d, urinary or bowel complaints, tick bites. Of note, patient reports to drinking 2-3 shots of vodka nightly. No signs of withdrawals, has never had withdrawal symptoms before. Patient denies feeling anxious or restless.    Her mother has chronic systolic congestive heart failure diagnosed in her 60s.  Her maternal grandfather also has cardiac history, unknown type.  She is not in contact with her father and unaware of his health status.    In ED, PBNP elevated at 2059.  CTA rule out PE but found small bilateral pleural effusions.  She received lasix 40mg x1, with mild improvement in her symptoms.

## 2025-03-16 NOTE — ED ADULT NURSE NOTE - NSFALLUNIVINTERV_ED_ALL_ED
Bed/Stretcher in lowest position, wheels locked, appropriate side rails in place/Call bell, personal items and telephone in reach/Instruct patient to call for assistance before getting out of bed/chair/stretcher/Non-slip footwear applied when patient is off stretcher/[place] to call system/Physically safe environment - no spills, clutter or unnecessary equipment/Purposeful proactive rounding/Room/bathroom lighting operational, light cord in reach

## 2025-03-16 NOTE — CONSULT NOTE ADULT - NS ATTEND AMEND GEN_ALL_CORE FT
Presents with worsening SOB and pleuritic chest pain.     SOB: Check TTE to evaluate LV function. Differential includes CHF vs. pleurisy. She has no lower extremity edema on exam suggestive of progressive CHF. Continue Lasix 40 IV daily. Will follow up after echocardiogram.

## 2025-03-16 NOTE — ED PROVIDER NOTE - PHYSICAL EXAMINATION
On physical exam patient is normocephalic atraumatic pupils equal round reactive to light oropharynx clear chest clear to auscultation bilaterally abdomen soft nontender nondistended bowel sounds positive no guarding rib extremities full range of motion no focal deficits noted

## 2025-03-16 NOTE — CONSULT NOTE ADULT - ASSESSMENT
45 y/o f with no significant pmhx who presents complaining of a 4 day hx of orthopnea associated with chest tightness when taking deep breaths. endorses + FHx of CHF (Mother, Grandparent)    CT/PE: no evidence of PE, small bilateral pleural effusion    # Acute CHF -  undefined etiology  # Sinus tachycardia  - pt c/o recent onset of orthopnea   - exam and imaging consistent with high output CHF  - she drinks chronically 3 shots a night, possible component of alcoholic cardiomyopathy?  - pregnancy negative  - trop WNL, ecg non-ischemic  - obtain ECHO  - check TSH  - check thiamine  - check ESR and CRP  - Lasix 40mg IVP qd  - etoh cessation advised  - further w/u pending above results    Discussed with the patient and her  at bedside 45 y/o f with no significant pmhx who presents complaining of a 4 day hx of orthopnea associated with chest tightness when taking deep breaths. endorses + FHx of CHF (Mother, Grandparent)    CT/PE: no evidence of PE, small bilateral pleural effusion    # Acute CHF -  undefined etiology  # Sinus tachycardia  - pt c/o recent onset of orthopnea   - she drinks chronically 3 shots a night, - pregnancy negative  - trop WNL, ecg non-ischemic  - obtain ECHO  - check TSH  - check thiamine  - check ESR and CRP  - Lasix 40mg IVP qd  - etoh cessation advised  - further w/u pending above results    Discussed with the patient and her  at bedside

## 2025-03-16 NOTE — H&P ADULT - NSHPPHYSICALEXAM_GEN_ALL_CORE
Vital Signs Last 24 Hrs  T(C): 36.6 (16 Mar 2025 11:08), Max: 36.6 (16 Mar 2025 11:08)  T(F): 97.9 (16 Mar 2025 11:08), Max: 97.9 (16 Mar 2025 11:08)  HR: 112 (16 Mar 2025 11:08) (112 - 112)  BP: 148/95 (16 Mar 2025 11:08) (148/95 - 148/95)  RR: 18 (16 Mar 2025 11:08) (18 - 18)  SpO2: 99% (16 Mar 2025 11:08) (99% - 99%)    Parameters below as of 16 Mar 2025 11:17  Patient On (Oxygen Delivery Method): room air    VITALS:     GENERAL: NAD, lying in bed comfortably  HEAD:  Atraumatic, Normocephalic  EYES: EOMI, PERRLA, conjunctiva and sclera clear  ENT: Moist mucous membranes  NECK: Supple, No JVD  CHEST/LUNG: Clear to auscultation bilaterally; No rales, rhonchi, wheezing, or rubs. Unlabored respirations  HEART: Regular rate and rhythm; No murmurs, rubs, or gallops  ABDOMEN: Bowel sounds present; Soft, Nontender, Nondistended. No hepatomegally  EXTREMITIES:  2+ Peripheral Pulses, brisk capillary refill. No clubbing, cyanosis, or edema  NERVOUS SYSTEM:  Alert & Oriented X3, speech clear. No deficits   MSK: FROM all 4 extremities, full and equal strength  SKIN: No rashes or lesions Vital Signs Last 24 Hrs  T(C): 36.6 (16 Mar 2025 11:08), Max: 36.6 (16 Mar 2025 11:08)  T(F): 97.9 (16 Mar 2025 11:08), Max: 97.9 (16 Mar 2025 11:08)  HR: 112 (16 Mar 2025 11:08) (112 - 112)  BP: 148/95 (16 Mar 2025 11:08) (148/95 - 148/95)  RR: 18 (16 Mar 2025 11:08) (18 - 18)  SpO2: 99% (16 Mar 2025 11:08) (99% - 99%)    Parameters below as of 16 Mar 2025 11:17  Patient On (Oxygen Delivery Method): room air    VITALS:     GENERAL: NAD, lying in bed comfortably  HEAD:  Atraumatic, Normocephalic  EYES: EOMI, PERRLA, conjunctiva and sclera clear  ENT: Moist mucous membranes  NECK: Supple, No JVD, no lymphadenopathy, no thyromegaly  CHEST/LUNG: Clear to auscultation bilaterally; No rales, rhonchi, wheezing, or rubs. Unlabored respirations.  Orthopnea reproducible on exam.  HEART: Regular rate and rhythm; No murmurs, rubs, or gallops  ABDOMEN: Bowel sounds present; Soft, Nontender, Nondistended. No hepatomegaly  EXTREMITIES:  2+ Peripheral Pulses, brisk capillary refill. No clubbing, cyanosis, or edema  NERVOUS SYSTEM:  Alert & Oriented X3, speech clear. No deficits   MSK: FROM all 4 extremities, full and equal strength  SKIN: No rashes or lesions

## 2025-03-16 NOTE — PATIENT PROFILE ADULT - FALL HARM RISK - UNIVERSAL INTERVENTIONS
Bed in lowest position, wheels locked, appropriate side rails in place/Call bell, personal items and telephone in reach/Instruct patient to call for assistance before getting out of bed or chair/Non-slip footwear when patient is out of bed/[place] to call system/Physically safe environment - no spills, clutter or unnecessary equipment/Purposeful Proactive Rounding/Room/bathroom lighting operational, light cord in reach

## 2025-03-16 NOTE — ED ADULT NURSE NOTE - IN THE PAST 12 MONTHS HAVE YOU USED DRUGS OTHER THAN THOSE REQUIRED FOR MEDICAL REASON?
----- Message from Aditi Chiu MD sent at 12/19/2017  7:16 AM CST -----  1. Your thyroid was noted to be within normal limit. No further testing or treatment is needed. 2. Your A1c which is a 3 month average of your blood sugar was noted to be 5.3 which means your elevated blood sugar is not due to diabetes. No further testing or treatment is needed. We will continue to monitor . Keep up with the good work. I would recommend to watch your  carbs and exercise daily for 30 minutes. 3. Your liver functions, kidney functions are excellent  4. Your sugar is 79 is excellent, which means you don't have diabetes. 5. Your total cholesterol,144, bad cholesterol is 41   good cholesterol is 93 and triglycerides are  (49) which are all excellent. Keep up the good work, I am very proud of you. 5. Your  WBC (infection count), platelet count (that helps stop bleeding when you get hurt)  were noted to be within normal limits. 6. Your hemoglobin (iron count) is low. Which is possibly causing you to be dizzy. We'll talk to Dr. Trevon Cobos about it and let me what would be the next step? No

## 2025-03-16 NOTE — H&P ADULT - ASSESSMENT
Assessment:          Plan:      # New onset congestive heart failure  # Pulmonary edema   # Community acquired PNA    - Admit to inpatient level of care-telemetry.  - CTA- PE ruled out  - Chest x-ray: LLL PNA  - Cardiology consulted- follow up recommendations  - Lasix 40mg IVP BID  - Follow up TTE   - Strict Is and Os  - Fluid restriction  - Standing weights.     Assessment:  Patient is a 45 y/o F with no significant pmhx who presents complaining of a 4 day hx of orthopnea and dyspnea on exertion associated with chest pain during inhalations.         Plan:      # New onset congestive heart failure  # Pulmonary edema   # Community acquired PNA    - Admit to inpatient level of care-telemetry.  - CTA- PE ruled out  - Chest x-ray: LLL PNA  - Cardiology consulted- follow up recommendations  - Lasix 40mg IVP BID  - Follow up TTE   - Strict Is and Os  - Fluid restriction  - Standing weights.  - Follow up urine and blood culture, MRSA, strep, legionella.   - Continue with IV abx, rocephin and azithromycin      CHG ordered.  VTE ppx: Lovenox  GI ppx: non indicated.  Diet: DASH      Above discussed with Dr. Sanders   Assessment:  Patient is a 45 y/o F with no significant pmhx who presents complaining of a 4 day hx of orthopnea and dyspnea on exertion associated with chest pain during inhalations.         Plan:      # New onset congestive heart failure in the setting of community acquired PNA  # Pulmonary edema     - Admit to inpatient level of care-telemetry.  - CTA- No evidence of pulmonary embolism. Bibasilar interlobular septal thickening and groundglass opacities most prominent in the left lower lobe. Small bilateral pleural effusions. Findings may reflect pulmonary edema  - Chest x-ray: LLL PNA  - Cardiology consulted- follow up recommendations  - Lasix 40mg IVP BID  - Follow up TTE   - Strict Is and Os  - Fluid restriction  - Standing weights.  - Repeat pBNP in AM and repeat chest x-ray  - Follow up urine and blood culture, MRSA, strep, legionella.   - Continue with IV abx, rocephin and azithromycin      # etOH abuse  - Ativan CIWA  - Addiction medicine  - Thiamine and folic acid     CHG ordered.  VTE ppx: Lovenox  GI ppx: non indicated.  Diet: DASH      Above discussed with Dr. Sanders   Assessment:  Patient is a 45 y/o F with no significant pmhx who presents complaining of a 4 day hx of orthopnea and dyspnea on exertion associated with chest pain during inhalations.         Plan:      # New onset congestive heart failure in the setting of community acquired PNA  # Pulmonary edema     - Admit to inpatient level of care-telemetry.  - CTA- No evidence of pulmonary embolism. Bibasilar interlobular septal thickening and groundglass opacities most prominent in the left lower lobe. Small bilateral pleural effusions. Findings may reflect pulmonary edema  - Chest x-ray: LLL PNA  - Cardiology consulted- follow up recommendations  - Lasix 40mg IVP BID  - Follow up TTE   - Strict Is and Os  - Fluid restriction  - Standing weights.  - Repeat pBNP in AM and repeat chest x-ray  - Follow up urine and blood culture, MRSA, strep, legionella.   - Continue with IV abx, rocephin and azithromycin      # etOH abuse  - Follow up UDS  - Monitor for etOH withdrawals   - Thiamine 500mg IV x 1 followed by 100mg daily there after and folic acid 1mg PO      CHG ordered.  VTE ppx: Lovenox  GI ppx: non indicated.  Diet: DASH      Above discussed with Dr. Sanders   This 45 y/o F with no significant pmhx who presents complaining of a 4 day hx of orthopnea associated with chest pain while taking deep breaths, non productive cough and myalgias.    #New onset acute congestive heart failure  Cardiology consulted  Maintain telemetry  CTA- No evidence of pulmonary embolism. Bibasilar interlobular septal thickening and groundglass opacities most prominent in the left lower lobe. Small bilateral pleural effusions. Findings may reflect pulmonary edema  Chest x-ray: LLL PNA  Lasix 40mg IVP QD  Follow up TTE   Strict Is and Os  Fluid restriction  Standing weights  Repeat pBNP in AM and repeat chest x-ray    #Community acquired LLL pneumonia  As seen on CT chest  Blood cultures 3/16 pending  MRSA, strep, legionella ordered  Ceftriaxone 1g QD  Azithromycin 500mg QD for 3 days    #Ethanol use  #Thiamine and folic acid deficiency  Follow up UDS  Monitor for etOH withdrawal symptoms  Thiamine 500mg IV x 1 followed by 100mg daily there after and folic acid 1mg PO    CHG ordered  DVT prophylaxis: lovenox  Diet: DASH  Code status: Full  Disposition: Await echocardiogram, cardiology recommendations    Above discussed with Dr. Sanders

## 2025-03-16 NOTE — ED ADULT TRIAGE NOTE - DIRECT TO ROOM CARE INITIATED:
16-Mar-2025 11:18
Consent: The patient's consent was obtained including but not limited to risks of crusting, scabbing, blistering, scarring, darker or lighter pigmentary change, recurrence, incomplete removal and infection.
Include Z78.9 (Other Specified Conditions Influencing Health Status) As An Associated Diagnosis?: No
Anesthesia Volume In Cc: 0.5
Medical Necessity Information: It is in your best interest to select a reason for this procedure from the list below. All of these items fulfill various CMS LCD requirements except the new and changing color options.
Total Number Of Lesions Treated: 5
Detail Level: Zone
Post-Care Instructions: I reviewed with the patient in detail post-care instructions. Patient is to wear sunprotection, and avoid picking at any of the treated lesions. Pt may apply Vaseline to crusted or scabbing areas.
Medical Necessity Clause: This procedure was medically necessary because the lesions that were treated were:

## 2025-03-16 NOTE — ED PROVIDER NOTE - CLINICAL SUMMARY MEDICAL DECISION MAKING FREE TEXT BOX
. Patient is a 44-year-old female presents for evaluation of chest pain shortness of breath particularly when she lays down onset over the past 2 to 3 days denies any fevers chills denies any cough patient states that it feels like pressure    On physical exam patient is normocephalic atraumatic pupils equal round reactive to light oropharynx clear chest clear to auscultation bilaterally abdomen soft nontender nondistended bowel sounds positive no guarding rib extremities full range of motion no focal deficits noted    Assessment plan patient presents for evaluation of shortness of breath chest pain we obtain EKG associated with STEMI not associated with arrhythmia denies we obtained a chest x-ray per my independent evaluation not consistent with ptx possible pneumonia given history I am more concerned with her history and symptoms less likely infectious but I will administer IV antibiotics patient also admitted for further evaluation.  CT: Follow

## 2025-03-16 NOTE — H&P ADULT - NSHPLABSRESULTS_GEN_ALL_CORE
LABS:                        12.3   7.56  )-----------( 211      ( 16 Mar 2025 11:10 )             37.4     03-16    140  |  106  |  13  ----------------------------<  107[H]  4.2   |  22  |  0.8    Ca    9.4      16 Mar 2025 12:34    TPro  7.1  /  Alb  4.2  /  TBili  0.4  /  DBili  x   /  AST  21  /  ALT  18  /  AlkPhos  80  03-16    LIVER FUNCTIONS - ( 16 Mar 2025 12:34 )  Alb: 4.2 g/dL / Pro: 7.1 g/dL / ALK PHOS: 80 U/L / ALT: 18 U/L / AST: 21 U/L / GGT: x             Urinalysis Basic - ( 16 Mar 2025 12:34 )    Color: x / Appearance: x / SG: x / pH: x  Gluc: 107 mg/dL / Ketone: x  / Bili: x / Urobili: x   Blood: x / Protein: x / Nitrite: x   Leuk Esterase: x / RBC: x / WBC x   Sq Epi: x / Non Sq Epi: x / Bacteria: x  ++++++++++++++++++++++++++++++++++++++++++++++++++++++++++++++++++++++++++++++++++++  < from: CT Angio Chest PE Protocol w/ IV Cont (03.16.25 @ 13:04) >    IMPRESSION:    No evidence of pulmonary embolism.    Bibasilar interlobular septal thickening and groundglass opacities most   prominent in the left lower lobe. Small bilateral pleural effusions.   Findings may reflect pulmonary edema. Clinical correlation is necessary   with clinical picture.    --- End of Report ---    NOHEMI HERNANDEZ MD; Resident Radiologist  This document has been electronically signed.  DEBBIE REAL MD; Attending Radiologist  This document has been electronically signed. Mar 16 2025  2:10PM    < end of copied text >  +++++++++++++++++++++++++++++++++++++++++++++++++++++++++++++++++++++++++++++++++++++++++++++++  < from: Xray Chest 2 Views PA/Lat (03.16.25 @ 12:18) >    IMPRESSION:    Left lower lobe pneumonia.    --- End of Report ---  ANDI GR MD; Attending Interventional Radiologist  This document has been electronically signed. Mar 16 2025 12:24PM    < end of copied text >

## 2025-03-17 ENCOUNTER — RESULT REVIEW (OUTPATIENT)
Age: 45
End: 2025-03-17

## 2025-03-17 LAB
AMPHET UR-MCNC: NEGATIVE — SIGNIFICANT CHANGE UP
ANION GAP SERPL CALC-SCNC: 15 MMOL/L — HIGH (ref 7–14)
BARBITURATES UR SCN-MCNC: NEGATIVE — SIGNIFICANT CHANGE UP
BENZODIAZ UR-MCNC: NEGATIVE — SIGNIFICANT CHANGE UP
BUN SERPL-MCNC: 11 MG/DL — SIGNIFICANT CHANGE UP (ref 10–20)
CALCIUM SERPL-MCNC: 9.8 MG/DL — SIGNIFICANT CHANGE UP (ref 8.4–10.5)
CHLORIDE SERPL-SCNC: 103 MMOL/L — SIGNIFICANT CHANGE UP (ref 98–110)
CO2 SERPL-SCNC: 23 MMOL/L — SIGNIFICANT CHANGE UP (ref 17–32)
COCAINE METAB.OTHER UR-MCNC: NEGATIVE — SIGNIFICANT CHANGE UP
CREAT SERPL-MCNC: 0.8 MG/DL — SIGNIFICANT CHANGE UP (ref 0.7–1.5)
CRP SERPL-MCNC: 4.2 MG/L — HIGH
DRUG SCREEN 1, URINE RESULT: SIGNIFICANT CHANGE UP
EGFR: 94 ML/MIN/1.73M2 — SIGNIFICANT CHANGE UP
EGFR: 94 ML/MIN/1.73M2 — SIGNIFICANT CHANGE UP
ERYTHROCYTE [SEDIMENTATION RATE] IN BLOOD: 7 MM/HR — SIGNIFICANT CHANGE UP (ref 0–20)
FENTANYL UR QL: NEGATIVE — SIGNIFICANT CHANGE UP
GLUCOSE SERPL-MCNC: 93 MG/DL — SIGNIFICANT CHANGE UP (ref 70–99)
HCT VFR BLD CALC: 38.5 % — SIGNIFICANT CHANGE UP (ref 37–47)
HGB BLD-MCNC: 12.8 G/DL — SIGNIFICANT CHANGE UP (ref 12–16)
MAGNESIUM SERPL-MCNC: 2 MG/DL — SIGNIFICANT CHANGE UP (ref 1.8–2.4)
MCHC RBC-ENTMCNC: 28.7 PG — SIGNIFICANT CHANGE UP (ref 27–31)
MCHC RBC-ENTMCNC: 33.2 G/DL — SIGNIFICANT CHANGE UP (ref 32–37)
MCV RBC AUTO: 86.3 FL — SIGNIFICANT CHANGE UP (ref 81–99)
METHADONE UR-MCNC: NEGATIVE — SIGNIFICANT CHANGE UP
NRBC BLD AUTO-RTO: 0 /100 WBCS — SIGNIFICANT CHANGE UP (ref 0–0)
OPIATES UR-MCNC: NEGATIVE — SIGNIFICANT CHANGE UP
OXYCODONE UR-MCNC: NEGATIVE — SIGNIFICANT CHANGE UP
PCP UR-MCNC: NEGATIVE — SIGNIFICANT CHANGE UP
PHOSPHATE SERPL-MCNC: 4.2 MG/DL — SIGNIFICANT CHANGE UP (ref 2.1–4.9)
PLATELET # BLD AUTO: 276 K/UL — SIGNIFICANT CHANGE UP (ref 130–400)
PMV BLD: 10.8 FL — HIGH (ref 7.4–10.4)
POTASSIUM SERPL-MCNC: 4 MMOL/L — SIGNIFICANT CHANGE UP (ref 3.5–5)
POTASSIUM SERPL-SCNC: 4 MMOL/L — SIGNIFICANT CHANGE UP (ref 3.5–5)
PROPOXYPHENE QUALITATIVE URINE RESULT: NEGATIVE — SIGNIFICANT CHANGE UP
RBC # BLD: 4.46 M/UL — SIGNIFICANT CHANGE UP (ref 4.2–5.4)
RBC # FLD: 13.7 % — SIGNIFICANT CHANGE UP (ref 11.5–14.5)
SODIUM SERPL-SCNC: 141 MMOL/L — SIGNIFICANT CHANGE UP (ref 135–146)
T4 FREE SERPL-MCNC: 1.3 NG/DL — SIGNIFICANT CHANGE UP (ref 0.9–1.7)
THC UR QL: NEGATIVE — SIGNIFICANT CHANGE UP
TSH SERPL-MCNC: 2.31 UIU/ML — SIGNIFICANT CHANGE UP (ref 0.27–4.2)
WBC # BLD: 5.34 K/UL — SIGNIFICANT CHANGE UP (ref 4.8–10.8)
WBC # FLD AUTO: 5.34 K/UL — SIGNIFICANT CHANGE UP (ref 4.8–10.8)

## 2025-03-17 PROCEDURE — 99223 1ST HOSP IP/OBS HIGH 75: CPT

## 2025-03-17 PROCEDURE — 99232 SBSQ HOSP IP/OBS MODERATE 35: CPT

## 2025-03-17 PROCEDURE — 93306 TTE W/DOPPLER COMPLETE: CPT | Mod: 26

## 2025-03-17 RX ORDER — REGADENOSON 0.08 MG/ML
0.4 INJECTION, SOLUTION INTRAVENOUS ONCE
Refills: 0 | Status: DISCONTINUED | OUTPATIENT
Start: 2025-03-17 | End: 2025-03-24

## 2025-03-17 RX ORDER — BENZONATATE 100 MG
100 CAPSULE ORAL EVERY 8 HOURS
Refills: 0 | Status: DISCONTINUED | OUTPATIENT
Start: 2025-03-17 | End: 2025-03-24

## 2025-03-17 RX ADMIN — CEFTRIAXONE 100 MILLIGRAM(S): 500 INJECTION, POWDER, FOR SOLUTION INTRAMUSCULAR; INTRAVENOUS at 15:08

## 2025-03-17 RX ADMIN — Medication 100 MILLIGRAM(S): at 15:10

## 2025-03-17 RX ADMIN — Medication 650 MILLIGRAM(S): at 13:39

## 2025-03-17 RX ADMIN — Medication 100 MILLIGRAM(S): at 11:28

## 2025-03-17 RX ADMIN — FUROSEMIDE 40 MILLIGRAM(S): 10 INJECTION INTRAMUSCULAR; INTRAVENOUS at 05:09

## 2025-03-17 RX ADMIN — Medication 250 MILLIGRAM(S): at 15:11

## 2025-03-17 RX ADMIN — Medication 650 MILLIGRAM(S): at 13:28

## 2025-03-17 RX ADMIN — FOLIC ACID 1 MILLIGRAM(S): 1 TABLET ORAL at 11:28

## 2025-03-17 RX ADMIN — Medication 105 MILLIGRAM(S): at 05:09

## 2025-03-17 RX ADMIN — ENOXAPARIN SODIUM 40 MILLIGRAM(S): 100 INJECTION SUBCUTANEOUS at 21:30

## 2025-03-17 NOTE — PROGRESS NOTE ADULT - SUBJECTIVE AND OBJECTIVE BOX
SAMMIE DIANE  44y  Female      Patient is a 44y old  Female who presents with a chief complaint of Dyspnea (16 Mar 2025 18:47)      INTERVAL HPI/OVERNIGHT EVENTS:  Patient feels clinically improved, able to lay more flat.    T(C): 36.7 (03-17-25 @ 13:00), Max: 36.7 (03-16-25 @ 17:44)  HR: 83 (03-17-25 @ 13:00) (83 - 110)  BP: 108/67 (03-17-25 @ 13:00) (108/67 - 122/89)  RR: 18 (03-17-25 @ 13:00) (18 - 18)  SpO2: 99% (03-17-25 @ 13:00) (97% - 99%)  Wt(kg): --Vital Signs Last 24 Hrs  T(C): 36.7 (17 Mar 2025 13:00), Max: 36.7 (16 Mar 2025 17:44)  T(F): 98 (17 Mar 2025 13:00), Max: 98 (16 Mar 2025 17:44)  HR: 83 (17 Mar 2025 13:00) (83 - 110)  BP: 108/67 (17 Mar 2025 13:00) (108/67 - 122/89)  BP(mean): --  RR: 18 (17 Mar 2025 13:00) (18 - 18)  SpO2: 99% (17 Mar 2025 13:00) (97% - 99%)    Parameters below as of 17 Mar 2025 13:00  Patient On (Oxygen Delivery Method): room air      03-16-25 @ 07:01  -  03-17-25 @ 07:00  --------------------------------------------------------  IN: 0 mL / OUT: 1800 mL / NET: -1800 mL    03-17-25 @ 07:01  -  03-17-25 @ 14:16  --------------------------------------------------------  IN: 0 mL / OUT: 200 mL / NET: -200 mL        PHYSICAL EXAM:  GENERAL: NAD, sitting up in bed  PSYCH: no agitation, baseline mentation  NERVOUS SYSTEM:  Alert, freely moving all extremities  PULMONARY: Clear to percussion bilaterally  CARDIOVASCULAR: Regular rate and rhythm  GI: Soft, Nontender  EXTREMITIES:  No cyanosis or edema  SKIN: No obvious rashes or lesions    Consultant(s) Notes Reviewed:  [x ] YES  [ ] NO    Discussed with Consultants/Other Providers [ x] YES     LABS                          12.8   5.34  )-----------( 276      ( 17 Mar 2025 07:02 )             38.5     03-17    141  |  103  |  11  ----------------------------<  93  4.0   |  23  |  0.8    Ca    9.8      17 Mar 2025 07:02  Phos  4.2     03-17  Mg     2.0     03-17    TPro  7.1  /  Alb  4.2  /  TBili  0.4  /  DBili  x   /  AST  21  /  ALT  18  /  AlkPhos  80  03-16      Urinalysis Basic - ( 17 Mar 2025 07:02 )    Color: x / Appearance: x / SG: x / pH: x  Gluc: 93 mg/dL / Ketone: x  / Bili: x / Urobili: x   Blood: x / Protein: x / Nitrite: x   Leuk Esterase: x / RBC: x / WBC x   Sq Epi: x / Non Sq Epi: x / Bacteria: x        Lactate Trend        CAPILLARY BLOOD GLUCOSE            RADIOLOGY & ADDITIONAL TESTS:    Imaging Personally Reviewed:  [ ] YES  [ ] NO    HEALTH ISSUES - PROBLEM Dx:

## 2025-03-17 NOTE — PROGRESS NOTE ADULT - ASSESSMENT
This 45 y/o F with no significant pmhx who presents complaining of a 4 day hx of orthopnea associated with chest pain while taking deep breaths, non productive cough and myalgias.    #New onset acute systolic congestive heart failure, EF 25%  Cardiology consulted  Maintain telemetry - may need life vest / ICD if does not recover ejection fraction  CTA- No evidence of pulmonary embolism. Bibasilar interlobular septal thickening and groundglass opacities most prominent in the left lower lobe. Small bilateral pleural effusions. Findings may reflect pulmonary edema  Lasix 40mg IVP QD  Follow up TTE   Strict Is and Os  Fluid restriction  Standing weights  TSH and free T4 within normal limits    Echocardiogram 3/17/2025   1. Moderately to severely decreased global left ventricular systolic   function.   2. LV Ejection Fraction by Veliz's Method with a biplane EF of 25 %.   3. Moderately increased left ventricular internal cavity size.   4. Spectral Doppler shows pseudonormal pattern of left ventricular   myocardial filling (Grade II diastolic dysfunction).   5. Normal right ventricular size and function.   6. Normal left atrial size.   7. Normal right atrial size.   8. Mild mitral valve regurgitation.   9. Adequate TR velocity was not obtained to accurately assess RVSP.  10. There is no evidence of pericardial effusion.    #Community acquired LLL pneumonia  As seen on CT chest  Blood cultures 3/16 pending  MRSA, strep, legionella ordered  Ceftriaxone 1g QD  Azithromycin 500mg QD for 3 days    #Ethanol use  #Thiamine and folic acid deficiency  Follow up UDS  Monitor for etOH withdrawal symptoms  Thiamine 500mg IV x 1 followed by 100mg daily there after and folic acid 1mg PO    CHG ordered  DVT prophylaxis: lovenox  Diet: DASH  Code status: Full  Disposition: Await cardiology recommendations

## 2025-03-18 LAB
-  COAGULASE NEGATIVE STAPHYLOCOCCUS: SIGNIFICANT CHANGE UP
ANION GAP SERPL CALC-SCNC: 12 MMOL/L — SIGNIFICANT CHANGE UP (ref 7–14)
BASOPHILS # BLD AUTO: 0.03 K/UL — SIGNIFICANT CHANGE UP (ref 0–0.2)
BASOPHILS NFR BLD AUTO: 0.4 % — SIGNIFICANT CHANGE UP (ref 0–1)
BUN SERPL-MCNC: 12 MG/DL — SIGNIFICANT CHANGE UP (ref 10–20)
CALCIUM SERPL-MCNC: 9.3 MG/DL — SIGNIFICANT CHANGE UP (ref 8.4–10.5)
CHLORIDE SERPL-SCNC: 104 MMOL/L — SIGNIFICANT CHANGE UP (ref 98–110)
CO2 SERPL-SCNC: 23 MMOL/L — SIGNIFICANT CHANGE UP (ref 17–32)
CREAT SERPL-MCNC: 0.8 MG/DL — SIGNIFICANT CHANGE UP (ref 0.7–1.5)
CULTURE RESULTS: ABNORMAL
EGFR: 94 ML/MIN/1.73M2 — SIGNIFICANT CHANGE UP
EGFR: 94 ML/MIN/1.73M2 — SIGNIFICANT CHANGE UP
EOSINOPHIL # BLD AUTO: 0.11 K/UL — SIGNIFICANT CHANGE UP (ref 0–0.7)
EOSINOPHIL NFR BLD AUTO: 1.6 % — SIGNIFICANT CHANGE UP (ref 0–8)
FOLATE SERPL-MCNC: >20 NG/ML — SIGNIFICANT CHANGE UP
GLUCOSE SERPL-MCNC: 104 MG/DL — HIGH (ref 70–99)
GRAM STN FLD: ABNORMAL
HCT VFR BLD CALC: 35.2 % — LOW (ref 37–47)
HGB BLD-MCNC: 11.6 G/DL — LOW (ref 12–16)
IMM GRANULOCYTES NFR BLD AUTO: 0.3 % — SIGNIFICANT CHANGE UP (ref 0.1–0.3)
LEGIONELLA AG UR QL: NEGATIVE — SIGNIFICANT CHANGE UP
LYMPHOCYTES # BLD AUTO: 2 K/UL — SIGNIFICANT CHANGE UP (ref 1.2–3.4)
LYMPHOCYTES # BLD AUTO: 29.4 % — SIGNIFICANT CHANGE UP (ref 20.5–51.1)
MAGNESIUM SERPL-MCNC: 2 MG/DL — SIGNIFICANT CHANGE UP (ref 1.8–2.4)
MCHC RBC-ENTMCNC: 28.6 PG — SIGNIFICANT CHANGE UP (ref 27–31)
MCHC RBC-ENTMCNC: 33 G/DL — SIGNIFICANT CHANGE UP (ref 32–37)
MCV RBC AUTO: 86.7 FL — SIGNIFICANT CHANGE UP (ref 81–99)
METHOD TYPE: SIGNIFICANT CHANGE UP
MONOCYTES # BLD AUTO: 0.65 K/UL — HIGH (ref 0.1–0.6)
MONOCYTES NFR BLD AUTO: 9.5 % — HIGH (ref 1.7–9.3)
NEUTROPHILS # BLD AUTO: 4 K/UL — SIGNIFICANT CHANGE UP (ref 1.4–6.5)
NEUTROPHILS NFR BLD AUTO: 58.8 % — SIGNIFICANT CHANGE UP (ref 42.2–75.2)
NRBC BLD AUTO-RTO: 0 /100 WBCS — SIGNIFICANT CHANGE UP (ref 0–0)
ORGANISM # SPEC MICROSCOPIC CNT: ABNORMAL
ORGANISM # SPEC MICROSCOPIC CNT: SIGNIFICANT CHANGE UP
PLATELET # BLD AUTO: 252 K/UL — SIGNIFICANT CHANGE UP (ref 130–400)
PMV BLD: 10.5 FL — HIGH (ref 7.4–10.4)
POTASSIUM SERPL-MCNC: 4 MMOL/L — SIGNIFICANT CHANGE UP (ref 3.5–5)
POTASSIUM SERPL-SCNC: 4 MMOL/L — SIGNIFICANT CHANGE UP (ref 3.5–5)
RBC # BLD: 4.06 M/UL — LOW (ref 4.2–5.4)
RBC # FLD: 13.6 % — SIGNIFICANT CHANGE UP (ref 11.5–14.5)
SODIUM SERPL-SCNC: 139 MMOL/L — SIGNIFICANT CHANGE UP (ref 135–146)
SPECIMEN SOURCE: SIGNIFICANT CHANGE UP
WBC # BLD: 6.81 K/UL — SIGNIFICANT CHANGE UP (ref 4.8–10.8)
WBC # FLD AUTO: 6.81 K/UL — SIGNIFICANT CHANGE UP (ref 4.8–10.8)

## 2025-03-18 PROCEDURE — 75561 CARDIAC MRI FOR MORPH W/DYE: CPT | Mod: 26

## 2025-03-18 PROCEDURE — 99233 SBSQ HOSP IP/OBS HIGH 50: CPT

## 2025-03-18 PROCEDURE — 78452 HT MUSCLE IMAGE SPECT MULT: CPT | Mod: 26

## 2025-03-18 PROCEDURE — 99232 SBSQ HOSP IP/OBS MODERATE 35: CPT

## 2025-03-18 RX ORDER — FUROSEMIDE 10 MG/ML
40 INJECTION INTRAMUSCULAR; INTRAVENOUS DAILY
Refills: 0 | Status: DISCONTINUED | OUTPATIENT
Start: 2025-03-19 | End: 2025-03-24

## 2025-03-18 RX ORDER — CEFTRIAXONE 500 MG/1
1000 INJECTION, POWDER, FOR SOLUTION INTRAMUSCULAR; INTRAVENOUS EVERY 24 HOURS
Refills: 0 | Status: DISCONTINUED | OUTPATIENT
Start: 2025-03-18 | End: 2025-03-19

## 2025-03-18 RX ORDER — VANCOMYCIN HCL IN 5 % DEXTROSE 1.5G/250ML
1000 PLASTIC BAG, INJECTION (ML) INTRAVENOUS EVERY 12 HOURS
Refills: 0 | Status: DISCONTINUED | OUTPATIENT
Start: 2025-03-18 | End: 2025-03-18

## 2025-03-18 RX ADMIN — Medication 100 MILLIGRAM(S): at 13:20

## 2025-03-18 RX ADMIN — CEFTRIAXONE 100 MILLIGRAM(S): 500 INJECTION, POWDER, FOR SOLUTION INTRAMUSCULAR; INTRAVENOUS at 23:17

## 2025-03-18 RX ADMIN — Medication 250 MILLIGRAM(S): at 15:18

## 2025-03-18 RX ADMIN — FOLIC ACID 1 MILLIGRAM(S): 1 TABLET ORAL at 13:21

## 2025-03-18 NOTE — CONSULT NOTE ADULT - SUBJECTIVE AND OBJECTIVE BOX
REENARADHA, MINI  44y, Female  Allergies    Vicodin (Unknown)  Nuts (Anaphylaxis)    Intolerances    LOS  2d    HPI  HPI:  Patient is a 45 y/o F with PMH of uterine fibroids who presents complaining of a 4 day hx of orthopnea associated with chest pain while taking deep breaths, non productive cough and myalgias. She states her chest tightness and orthopnea began all of a sudden 4 days ago.  She at first though it was related to her occupation, which is a .  She has been sleeping at an elevated angle due to her symptoms.  Denies sick contacts.  States symptoms have been progressively worsening prompting evaluation to the ER. She's had a mild cough for the past several months, which she took over the counter medication for without much improvement.  Patient denies recent travel, sick contacts, fever, chills, n/v/d, urinary or bowel complaints, tick bites. Of note, patient reports to drinking 2-3 shots of vodka nightly. No signs of withdrawals, has never had withdrawal symptoms before. Patient denies feeling anxious or restless.    Her mother has chronic systolic congestive heart failure diagnosed in her 60s.  Her maternal grandfather also has cardiac history, unknown type.  She is not in contact with her father and unaware of his health status.    In ED, PBNP elevated at 2059.  CTA rule out PE but found small bilateral pleural effusions.  She received lasix 40mg x1, with mild improvement in her symptoms. (16 Mar 2025 16:50)    INFECTIOUS DISEASE HISTORY:  ID consulted for antimicrobial recommendations.     Prior hospital charts reviewed [Yes]  Primary team notes reviewed [Yes]  Other consultant notes reviewed [Yes]    REVIEW OF SYSTEMS:  CONSTITUTIONAL: No fever or chills  HEENT: No sore throat  RESPIRATORY: No cough, no shortness of breath  CARDIOVASCULAR: No chest pain or palpitations  GASTROINTESTINAL: No abdominal or epigastric pain  GENITOURINARY: No dysuria  NEUROLOGICAL: No headache/dizziness  MSK: No joint pain, erythema, or swelling; no back pain  SKIN: No itching, rashes  All other ROS negative except noted above    PAST MEDICAL & SURGICAL HISTORY:  Fibroids      No significant past surgical history      SOCIAL HISTORY:  - No recent travel    FAMILY HISTORY:  Family history of CHF (congestive heart failure) (Mother, Grandparent)    ANTIMICROBIALS:  azithromycin  IVPB 500 every 24 hours  cefTRIAXone   IVPB 1000 every 24 hours      ANTIMICROBIALS (past 90 days):  MEDICATIONS  (STANDING):  azithromycin  IVPB   250 mL/Hr IV Intermittent (03-17-25 @ 15:11)    azithromycin  IVPB   250 mL/Hr IV Intermittent (03-16-25 @ 15:30)    cefTRIAXone   IVPB   100 mL/Hr IV Intermittent (03-17-25 @ 15:08)    cefTRIAXone   IVPB   100 mL/Hr IV Intermittent (03-16-25 @ 15:30)        OTHER MEDS:   MEDICATIONS  (STANDING):  acetaminophen     Tablet .. 650 every 6 hours PRN  benzonatate 100 every 8 hours PRN  enoxaparin Injectable 40 every 24 hours  furosemide   Injectable 40 daily      VITALS:  Vital Signs Last 24 Hrs  T(F): 98.3 (03-18-25 @ 14:38), Max: 98.4 (03-17-25 @ 21:00)    Vital Signs Last 24 Hrs  HR: 83 (03-18-25 @ 14:38) (83 - 98)  BP: 115/80 (03-18-25 @ 14:38) (105/69 - 115/80)  RR: 16 (03-18-25 @ 14:38)  SpO2: 100% (03-18-25 @ 14:38) (97% - 100%)  Wt(kg): --    EXAM:  GENERAL: NAD  HEAD: No head lesions  NECK: Supple  CHEST/LUNG: Shallow breath sounds.   HEART: S1 S2  ABDOMEN: Soft, nontender  EXTREMITIES: No clubbing  NERVOUS SYSTEM: Alert and oriented to person  MSK: No joint erythema, swelling or pain  SKIN: No rashes or lesions, no superficial thrombophlebitis    Labs:                        11.6   6.81  )-----------( 252      ( 18 Mar 2025 06:14 )             35.2     03-18    139  |  104  |  12  ----------------------------<  104[H]  4.0   |  23  |  0.8    Ca    9.3      18 Mar 2025 06:14  Phos  4.2     03-17  Mg     2.0     03-18        WBC Trend:  WBC Count: 6.81 (03-18-25 @ 06:14)  WBC Count: 5.34 (03-17-25 @ 07:02)  WBC Count: 7.56 (03-16-25 @ 11:10)          Creatine Trend:  Creatinine: 0.8 (03-18)  Creatinine: 0.8 (03-17)  Creatinine: 0.8 (03-16)  Creatinine: 0.8 (03-16)      Liver Biochemical Testing Trend:  Alanine Aminotransferase (ALT/SGPT): 18 (03-16)  Alanine Aminotransferase (ALT/SGPT): 21 (03-16)  Aspartate Aminotransferase (AST/SGOT): 21 (03-16-25 @ 12:34)  Aspartate Aminotransferase (AST/SGOT): 42 (03-16-25 @ 11:10)  Bilirubin Total: 0.4 (03-16)  Bilirubin Total: 0.5 (03-16)      Trend LDH          Urinalysis Basic - ( 18 Mar 2025 06:14 )    Color: x / Appearance: x / SG: x / pH: x  Gluc: 104 mg/dL / Ketone: x  / Bili: x / Urobili: x   Blood: x / Protein: x / Nitrite: x   Leuk Esterase: x / RBC: x / WBC x   Sq Epi: x / Non Sq Epi: x / Bacteria: x        MICROBIOLOGY:    Female    Culture - Blood (collected 16 Mar 2025 15:42)  Source: Blood Blood-Peripheral  Preliminary Report (17 Mar 2025 22:02):    No growth at 24 hours    Culture - Blood (collected 16 Mar 2025 15:42)  Source: Blood Blood-Peripheral  Gram Stain (18 Mar 2025 08:06):    Growth in aerobic bottle: Gram Positive Cocci in Clusters  Preliminary Report (18 Mar 2025 08:07):    Growth in aerobic bottle: Gram Positive Cocci in Clusters    Direct identification is available within approximately 3-5    hours either by Blood Panel Multiplexed PCR or Direct    MALDI-TOF. Details: [email or web address]  Organism: Blood Culture PCR (18 Mar 2025 09:16)  Organism: Blood Culture PCR (18 Mar 2025 09:16)      Method Type: PCR      -  Coagulase negative Staphylococcus: Detec  C-Reactive Protein: 4.2 (03-17)  Troponin T, High Sensitivity Result: <6 (03-16)          INFLAMMATORY MARKERS  Sedimentation Rate, Erythrocyte: 7 mm/Hr (03-17-25 @ 07:02)      RADIOLOGY & ADDITIONAL TESTS:  I have personally reviewed the imagings.  CXR  Xray Chest 2 Views PA/Lat:   ACC: [id number] EXAM:  XR CHEST PA LAT 2V   ORDERED BY: JEWEL MCDONALD     PROCEDURE DATE:  03/16/2025          INTERPRETATION:  CLINICAL HISTORY: Chest pain.    COMPARISON: September 17, 2022.    TECHNIQUE: Frontal and lateral chest radiographs.    FINDINGS:    Support devices: None.    Cardiac/mediastinum/hilum: Stable.    Lung parenchyma/Pleura: Retrocardiac opacification.    Skeleton/soft tissues: Stable.      IMPRESSION:    Left lower lobe pneumonia.    --- End of Report ---            ANDI GR MD; Attending Interventional Radiologist  This document has been electronically signed. Mar 16 2025 12:24PM (03-16-25 @ 12:18)      CT  CT Angio Chest PE Protocol w/ IV Cont:   ACC: [id number] EXAM:  CT ANGIO CHEST PULM ART [facility]   ORDERED BY: JEWEL MCDONALD     PROCEDURE DATE:  03/16/2025          INTERPRETATION:  CLINICAL INFORMATION: Shortness of breath    COMPARISON: None.    CONTRAST/COMPLICATIONS:  IV Contrast: Omnipaque 350  70 cc administered   30 cc discarded  Oral Contrast: NONE  .    PROCEDURE:  CT Angiography of the Chest.  Sagittal and coronal reformats were performed as well as 3D (MIP)   reconstructions.    FINDINGS:    LUNGS AND LARGE AIRWAYS: Patentcentral airways. Biapical pleural   thickening. Bibasilar interlobular septal thickening and left lower lobe   groundglass opacities. Small bilateral pleural effusions.No pneumothorax.  VESSELS: Within normal limits. No filling defects to suggest pulmonary   embolism.  HEART: Heart size is normal. No pericardial effusion.  MEDIASTINUM AND MIO: No lymphadenopathy.  CHEST WALL AND LOWER NECK: Within normal limits.  VISUALIZED UPPER ABDOMEN: Within normal limits.  BONES: Within normal limits.    IMPRESSION:    No evidence of pulmonary embolism.    Bibasilar interlobular septal thickening and groundglass opacities most   prominent in the left lower lobe. Small bilateral pleural effusions.   Findings may reflect pulmonary edema. Clinical correlation is necessary   with clinical picture.    --- End of Report ---          NOHEMI HERNANDEZ MD; Resident Radiologist  This document has been electronically signed.  DEBBIE REAL MD; Attending Radiologist  This document has been electronically signed. Mar 16 2025  2:10PM (03-16-25 @ 13:04)    < from: NM Nuclear Stress Pharmacologic Multiple (03.18.25 @ 12:09) >  Impression:  1. NORMAL LEXISCAN / REST MYOCARDIAL PERFUSION SPECT TOMOGRAPHY, WITH NO   EVIDENCE FOR ISCHEMIA DURING LEXISCAN INFUSION.  2. DILATED LEFT VENTRICLE WITH MODERATE GLOBAL HYPOKINESIS. ALL WALLS OF   THE LEFT VENTRICLE THICKEN.  3. LEFT VENTRICULAR EJECTION FRACTION OF  36 % WHICH IS LOW. WALL MOTION   ANALYSIS SUGGESTS LVEF OF 30-35%.    --- End of Report ---    < end of copied text >  < from: US Transvaginal (09.17.22 @ 20:01) >    IMPRESSION:In the left adnexa there is a 10.3 cm x 6 cm x 4 cm complex   cystic mass presumably representing an enlarged left ovary.a Left ovary   is not seen separate from this structure. Blood flow is seen in this   cystic structure however, by size criteria left ovarian torsion cannot be   excluded.    < end of copied text >    CARDIOLOGY TESTING  12 Lead ECG:   Ventricular Rate 104 BPM    Atrial Rate 104 BPM    P-R Interval 144 ms    QRS Duration 74 ms    Q-T Interval 336 ms    QTC Calculation(Bazett) 441 ms    P Axis 93 degrees    R Axis 59 degrees    T Axis 86 degrees    Diagnosis Line Sinus tachycardia  Minimal voltage criteria for LVH, may be normal variant  Nonspecific T wave abnormality  Abnormal ECG    Confirmed by BEN BEY, MARISSA ([id number]) on 3/16/2025 11:13:44 PM (03-16-25 @ 11:06)            
HPI:  Patient is a 45 y/o F with no significant pmhx who presents complaining of a 4 day hx of orthopnea associated with chest pain while taking deep breaths, non productive cough and myalgias. Denies sick contacts.  States symptoms have been progressively worsening prompting evaluation to the ER. Patient denies recent travel, sick contacts, fever, chills, n/v/d, urinary or bowel complaints. Of note, patient reports to drinking 2 shots of vodka nightly. No signs of withdrawals. Patient denies feeling anxious or restless.  (16 Mar 2025 16:50)              PAST MEDICAL & SURGICAL HISTORY  Fibroids    No significant past surgical history        FAMILY HISTORY:  FAMILY HISTORY:  Family history of CHF (congestive heart failure) (Mother, Grandparent)        SOCIAL HISTORY:  []smoker  []Alcohol  []Drug    ALLERGIES:  Vicodin (Unknown)  Nuts (Anaphylaxis)      MEDICATIONS:  MEDICATIONS  (STANDING):  azithromycin  IVPB 500 milliGRAM(s) IV Intermittent every 24 hours  cefTRIAXone   IVPB 1000 milliGRAM(s) IV Intermittent every 24 hours  enoxaparin Injectable 40 milliGRAM(s) SubCutaneous every 24 hours  folic acid 1 milliGRAM(s) Oral daily  furosemide   Injectable 40 milliGRAM(s) IV Push two times a day  thiamine IVPB 500 milliGRAM(s) IV Intermittent once    MEDICATIONS  (PRN):      HOME MEDICATIONS:  Home Medications:      VITALS:   T(F): 98 (03-16 @ 17:44), Max: 98 (03-16 @ 17:44)  HR: 110 (03-16 @ 17:44) (110 - 112)  BP: 122/89 (03-16 @ 17:44) (122/89 - 148/95)  BP(mean): --  RR: 18 (03-16 @ 11:08) (18 - 18)  SpO2: 98% (03-16 @ 17:44) (98% - 99%)    I&O's Summary      REVIEW OF SYSTEMS:  CONSTITUTIONAL: No weakness, fevers or chills  EYES: No visual changes  ENT: No vertigo or throat pain   NECK: No pain or stiffness  RESPIRATORY: No cough, wheezing, hemoptysis; No shortness of breath  CARDIOVASCULAR: No chest pain or palpitations  GASTROINTESTINAL: No abdominal or epigastric pain. No nausea, vomiting, or hematemesis; No diarrhea or constipation. No melena or hematochezia.  GENITOURINARY: No dysuria, frequency or hematuria  NEUROLOGICAL: No numbness or weakness  SKIN: No itching, no rashes  MSK: no    PHYSICAL EXAM:  NEURO: patient is awake , alert and oriented  GEN: Not in acute distress  NECK: no thyroid enlargement, no JVD  LUNGS: Clear to auscultation bilaterally   CARDIOVASCULAR: S1/S2 present, RRR , no murmurs or rubs, no carotid bruits,  + PP bilaterally  ABD: Soft, non-tender, non-distended, +BS  EXT: No VERONICA  SKIN: Intact    LABS:                        12.3   7.56  )-----------( 211      ( 16 Mar 2025 11:10 )             37.4     03-16    140  |  106  |  13  ----------------------------<  107[H]  4.2   |  22  |  0.8    Ca    9.4      16 Mar 2025 12:34    TPro  7.1  /  Alb  4.2  /  TBili  0.4  /  DBili  x   /  AST  21  /  ALT  18  /  AlkPhos  80  03-16              Troponin trend:            RADIOLOGY:  < from: CT Angio Chest PE Protocol w/ IV Cont (03.16.25 @ 13:04) >  IMPRESSION:    No evidence of pulmonary embolism.    Bibasilar interlobular septal thickening and groundglass opacities most   prominent in the left lower lobe. Small bilateral pleural effusions.   Findings may reflect pulmonary edema. Clinical correlation is necessary   with clinical picture.    --- End of Report ---    < end of copied text >      ECG:    TELEMETRY EVENTS:

## 2025-03-18 NOTE — CONSULT NOTE ADULT - ASSESSMENT
ASSESSMENT  This is a 45 y/o F with PMH of uterine fibroids who presents with orthopnea      IMPRESSION  #Respiratory distress- Likely from Pulmonary edema  #Heart failure with reduced EF (New Diagnosis)- Unclear cause.   #Doubt Pneumonia   #Staph Coagulase Bacteremia- Contaminant  #History of uterine fibroids   MRSA nares negative    RECOMMENDATIONS  -On IV Ceftriaxone 1 gram q 24 hours+ Azithromycin 500mg q 24 hours.  -Switch to PO Vantin 200mg BID for total of 5 days from 3/16/2025  -Workup for heart failure as per the primary team.     If any questions, please send a message or call on [employer]  Please continue to update ID with any pertinent new laboratory or radiographic findings.    Liset Worrell M.D  Infectious Diseases Attending/   [employer] at [facility]

## 2025-03-18 NOTE — PROGRESS NOTE ADULT - SUBJECTIVE AND OBJECTIVE BOX
Subjective/Objective:     INTERVAL HISTORY  seen and examined this am. she is feeling better, breathing improved. TTE with decreased LVSF 25%, plan for lexiscan nuclear this am     MEDICATIONS  (STANDING):  azithromycin  IVPB 500 milliGRAM(s) IV Intermittent every 24 hours  cefTRIAXone   IVPB 1000 milliGRAM(s) IV Intermittent every 24 hours  enoxaparin Injectable 40 milliGRAM(s) SubCutaneous every 24 hours  folic acid 1 milliGRAM(s) Oral daily  furosemide   Injectable 40 milliGRAM(s) IV Push daily  regadenoson Injectable 0.4 milliGRAM(s) IV Push once  thiamine 100 milliGRAM(s) Oral daily    MEDICATIONS  (PRN):  acetaminophen     Tablet .. 650 milliGRAM(s) Oral every 6 hours PRN Mild Pain (1 - 3)  benzonatate 100 milliGRAM(s) Oral every 8 hours PRN Cough          Vital Signs Last 24 Hrs  T(C): 36.9 (18 Mar 2025 04:00), Max: 36.9 (17 Mar 2025 21:00)  T(F): 98.4 (18 Mar 2025 04:00), Max: 98.4 (17 Mar 2025 21:00)  HR: 86 (18 Mar 2025 04:00) (83 - 98)  BP: 105/69 (18 Mar 2025 04:00) (105/69 - 112/78)  BP(mean): --  RR: 18 (18 Mar 2025 04:00) (18 - 18)  SpO2: 97% (18 Mar 2025 04:00) (97% - 99%)    Parameters below as of 18 Mar 2025 04:00  Patient On (Oxygen Delivery Method): room air      I&O's Detail    17 Mar 2025 07:01  -  18 Mar 2025 07:00  --------------------------------------------------------  IN:  Total IN: 0 mL    OUT:    Voided (mL): 1050 mL  Total OUT: 1050 mL    Total NET: -1050 mL      18 Mar 2025 07:01  -  18 Mar 2025 11:14  --------------------------------------------------------  IN:  Total IN: 0 mL    OUT:    Voided (mL): 150 mL  Total OUT: 150 mL    Total NET: -150 mL        EKG/ TELEM:    LABS:                          11.6   6.81  )-----------( 252      ( 18 Mar 2025 06:14 )             35.2       18 Mar 2025 06:14    139    |  104    |  12     ----------------------------<  104[H]  4.0     |  23     |  0.8    17 Mar 2025 07:02    141    |  103    |  11     ----------------------------<  93     4.0     |  23     |  0.8      Ca    9.3        18 Mar 2025 06:14  Ca    9.8        17 Mar 2025 07:02  Phos  4.2       17 Mar 2025 07:02  Mg     2.0       18 Mar 2025 06:14  Mg     2.0       17 Mar 2025 07:02    TPro  7.1    /  Alb  4.2    /  TBili  0.4    /  DBili  x      /  AST  21     /  ALT  18     /  AlkPhos  80     16 Mar 2025 12:34                      Diagnostic testing:  < from: TTE Echo Complete w/o Contrast w/ Doppler (03.17.25 @ 07:18) >  Summary:   1. Moderately to severely decreased global left ventricular systolic   function.   2. LV Ejection Fraction by Veliz's Method with a biplane EF of 25 %.   3. Moderately increased left ventricular internal cavity size.   4. Spectral Doppler shows pseudonormal pattern of left ventricular   myocardialfilling (Grade II diastolic dysfunction).   5. Normal right ventricular size and function.   6. Normal left atrial size.   7. Normal right atrial size.   8. Mild mitral valve regurgitation.   9. Adequate TR velocity was not obtained to accurately assess RVSP.  10. There is no evidence of pericardial effusion.    PHYSICIAN INTERPRETATION:  Left Ventricle: The left ventricular internal cavity size is moderately   increased. There is no left ventricular hypertrophy. Global LV systolic   function wasmoderately to severely decreased. Spectral Doppler shows   pseudonormal pattern of left ventricular myocardial filling (Grade II   diastolic dysfunction).  Right Ventricle: Normal right ventricular size and function. The right   ventricular size is normal. RV wall thickness is normal. RV systolic   function is normal. TV S' 0.1 m/s.  Left Atrium: Normal left atrial size.  Right Atrium: Normal right atrial size.  Pericardium: There is no evidence of pericardial effusion.  Mitral Valve: No evidenceof mitral stenosis. Mild mitral valve   regurgitation is seen.  Tricuspid Valve: Structurally normal tricuspid valve, with normal leaflet   excursion. No tricuspid regurgitation is visualized. Adequate TR velocity   was not obtained to accurately assess RVSP.  Aortic Valve: The aortic valve is trileaflet. No evidence of aortic   stenosis. No evidence of aortic valve regurgitation is seen.  Pulmonic Valve: The pulmonic valve was not well visualized. No indication   of pulmonic valve regurgitation.  Aorta: The aortic root and ascending aorta are structurally normal, with   no evidence of dilitation.  Venous: The inferior vena cava is normal. The inferior vena cava was   normal sized, with respiratory size variation less than 50%.    < end of copied text >        Assessment and Plan:

## 2025-03-18 NOTE — PROGRESS NOTE ADULT - ASSESSMENT
This 45 y/o F with no significant pmhx who presents complaining of a 4 day hx of orthopnea associated with chest pain while taking deep breaths, non productive cough and myalgias.    #New onset acute systolic congestive heart failure, reduced ejection fraction EF 25%  Cardiology consulted  Maintain telemetry - may need life vest / ICD if does not recover ejection fraction  CTA- No evidence of pulmonary embolism. Bibasilar interlobular septal thickening and groundglass opacities most prominent in the left lower lobe. Small bilateral pleural effusions. Findings may reflect pulmonary edema  Lasix 40mg IVP QD  Strict Is and Os  Fluid restriction  Standing weights  TSH and free T4 within normal limits  Cardiac stress test today  Possibly dilated cardiomyopathy due to ethanol use, though cardiac silhouette not enlarged on CT chest    Echocardiogram 3/17/2025   1. Moderately to severely decreased global left ventricular systolic   function.   2. LV Ejection Fraction by Veliz's Method with a biplane EF of 25 %.   3. Moderately increased left ventricular internal cavity size.   4. Spectral Doppler shows pseudonormal pattern of left ventricular   myocardial filling (Grade II diastolic dysfunction).   5. Normal right ventricular size and function.   6. Normal left atrial size.   7. Normal right atrial size.   8. Mild mitral valve regurgitation.   9. Adequate TR velocity was not obtained to accurately assess RVSP.  10. There is no evidence of pericardial effusion.    #Community acquired LLL pneumonia  #Gram positive bacteremia  Infectious disease consulted  As seen on CT chest  Blood cultures 3/16 1 of 2 growing coagulase negative staff by PCR  Blood cultures 3/18 ordered  strep pneumo and legionella pending  Ceftriaxone 1g QD  Azithromycin 500mg QD for 3 days  One dose of vancomycin ordered, but discontinued as MRSA nares negative    #Ethanol use  #Thiamine and folic acid deficiency  UDS negative  Monitor for etOH withdrawal symptoms  Thiamine 100mg daily and folic acid 1mg PO    CHG ordered  DVT prophylaxis: lovenox  Diet: DASH  Code status: Full  Disposition: Await cardiology recommendations

## 2025-03-18 NOTE — PROGRESS NOTE ADULT - SUBJECTIVE AND OBJECTIVE BOX
SAMMIE DIANE  44y  Female      Patient is a 44y old  Female who presents with a chief complaint of Dyspnea (16 Mar 2025 18:47)      INTERVAL HPI/OVERNIGHT EVENTS:  Patient feeling better, now able to lie flat.    T(C): 36.9 (03-18-25 @ 04:00), Max: 36.9 (03-17-25 @ 21:00)  HR: 86 (03-18-25 @ 04:00) (83 - 98)  BP: 105/69 (03-18-25 @ 04:00) (105/69 - 112/78)  RR: 18 (03-18-25 @ 04:00) (18 - 18)  SpO2: 97% (03-18-25 @ 04:00) (97% - 99%)  Wt(kg): --Vital Signs Last 24 Hrs  T(C): 36.9 (18 Mar 2025 04:00), Max: 36.9 (17 Mar 2025 21:00)  T(F): 98.4 (18 Mar 2025 04:00), Max: 98.4 (17 Mar 2025 21:00)  HR: 86 (18 Mar 2025 04:00) (83 - 98)  BP: 105/69 (18 Mar 2025 04:00) (105/69 - 112/78)  BP(mean): --  RR: 18 (18 Mar 2025 04:00) (18 - 18)  SpO2: 97% (18 Mar 2025 04:00) (97% - 99%)    Parameters below as of 18 Mar 2025 04:00  Patient On (Oxygen Delivery Method): room air          03-17-25 @ 07:01  -  03-18-25 @ 07:00  --------------------------------------------------------  IN: 0 mL / OUT: 1050 mL / NET: -1050 mL    03-18-25 @ 07:01  -  03-18-25 @ 10:45  --------------------------------------------------------  IN: 0 mL / OUT: 150 mL / NET: -150 mL        PHYSICAL EXAM:  GENERAL: NAD, sitting up in bed  PSYCH: no agitation, baseline mentation  NERVOUS SYSTEM:  Alert, freely moving all extremities  PULMONARY: Clear to percussion bilaterally  CARDIOVASCULAR: Regular rate and rhythm  GI: Soft, Nontender  EXTREMITIES:  No cyanosis or edema  SKIN: No obvious rashes or lesions    Consultant(s) Notes Reviewed:  [x ] YES  [ ] NO    Discussed with Consultants/Other Providers [ x] YES     LABS                          11.6   6.81  )-----------( 252      ( 18 Mar 2025 06:14 )             35.2     03-18    139  |  104  |  12  ----------------------------<  104[H]  4.0   |  23  |  0.8    Ca    9.3      18 Mar 2025 06:14  Phos  4.2     03-17  Mg     2.0     03-18    TPro  7.1  /  Alb  4.2  /  TBili  0.4  /  DBili  x   /  AST  21  /  ALT  18  /  AlkPhos  80  03-16      Urinalysis Basic - ( 18 Mar 2025 06:14 )    Color: x / Appearance: x / SG: x / pH: x  Gluc: 104 mg/dL / Ketone: x  / Bili: x / Urobili: x   Blood: x / Protein: x / Nitrite: x   Leuk Esterase: x / RBC: x / WBC x   Sq Epi: x / Non Sq Epi: x / Bacteria: x        Lactate Trend        CAPILLARY BLOOD GLUCOSE            RADIOLOGY & ADDITIONAL TESTS:    Imaging Personally Reviewed:  [ ] YES  [ ] NO    HEALTH ISSUES - PROBLEM Dx:

## 2025-03-18 NOTE — PROGRESS NOTE ADULT - ASSESSMENT
43 y/o f with no significant pmhx who presents complaining of a 4 day hx of orthopnea associated with chest tightness when taking deep breaths. endorses + FHx of CHF (Mother, Grandparent)    CT/PE: no evidence of PE, small bilateral pleural effusion  TTE 03/17/25: LVEF 25%, G2DD    # Acute HFrEF 25% -  undefined etiology  - pt c/o recent onset of orthopnea   - she drinks chronically 3 shots a night, - pregnancy negative  - trop WNL, ecg non-ischemic  - TTE with decreased LVSF 25%, plan for lexiscan nuclear this am   - start on low dose BB and Entresto prior to dc, can add SGLT2-i/ MRA outpatient   - prn lasix  - daily home weight monitoring   - cardiac MRI tomorrow  - will benefit from Lifevest on discharge for SCD pppx  - re-ECHO outpatient in 3 months  - thiamine, folate  - Lasix 40mg IVP qd  - etoh cessation advised  - obtain repeat CMP outpatient in 1 week  - f/u with cardio in 1 week      Discussed with Dr Harman

## 2025-03-19 LAB
ANION GAP SERPL CALC-SCNC: 10 MMOL/L — SIGNIFICANT CHANGE UP (ref 7–14)
BASOPHILS # BLD AUTO: 0.03 K/UL — SIGNIFICANT CHANGE UP (ref 0–0.2)
BASOPHILS NFR BLD AUTO: 0.4 % — SIGNIFICANT CHANGE UP (ref 0–1)
BUN SERPL-MCNC: 10 MG/DL — SIGNIFICANT CHANGE UP (ref 10–20)
CALCIUM SERPL-MCNC: 9 MG/DL — SIGNIFICANT CHANGE UP (ref 8.4–10.5)
CHLORIDE SERPL-SCNC: 101 MMOL/L — SIGNIFICANT CHANGE UP (ref 98–110)
CO2 SERPL-SCNC: 23 MMOL/L — SIGNIFICANT CHANGE UP (ref 17–32)
CREAT SERPL-MCNC: 0.8 MG/DL — SIGNIFICANT CHANGE UP (ref 0.7–1.5)
EGFR: 94 ML/MIN/1.73M2 — SIGNIFICANT CHANGE UP
EGFR: 94 ML/MIN/1.73M2 — SIGNIFICANT CHANGE UP
EOSINOPHIL # BLD AUTO: 0.18 K/UL — SIGNIFICANT CHANGE UP (ref 0–0.7)
EOSINOPHIL NFR BLD AUTO: 2.7 % — SIGNIFICANT CHANGE UP (ref 0–8)
GLUCOSE SERPL-MCNC: 93 MG/DL — SIGNIFICANT CHANGE UP (ref 70–99)
HCT VFR BLD CALC: 35.5 % — LOW (ref 37–47)
HGB BLD-MCNC: 11.6 G/DL — LOW (ref 12–16)
IMM GRANULOCYTES NFR BLD AUTO: 0.3 % — SIGNIFICANT CHANGE UP (ref 0.1–0.3)
LYMPHOCYTES # BLD AUTO: 1.65 K/UL — SIGNIFICANT CHANGE UP (ref 1.2–3.4)
LYMPHOCYTES # BLD AUTO: 24.3 % — SIGNIFICANT CHANGE UP (ref 20.5–51.1)
MCHC RBC-ENTMCNC: 28.4 PG — SIGNIFICANT CHANGE UP (ref 27–31)
MCHC RBC-ENTMCNC: 32.7 G/DL — SIGNIFICANT CHANGE UP (ref 32–37)
MCV RBC AUTO: 87 FL — SIGNIFICANT CHANGE UP (ref 81–99)
MONOCYTES # BLD AUTO: 0.56 K/UL — SIGNIFICANT CHANGE UP (ref 0.1–0.6)
MONOCYTES NFR BLD AUTO: 8.3 % — SIGNIFICANT CHANGE UP (ref 1.7–9.3)
NEUTROPHILS # BLD AUTO: 4.34 K/UL — SIGNIFICANT CHANGE UP (ref 1.4–6.5)
NEUTROPHILS NFR BLD AUTO: 64 % — SIGNIFICANT CHANGE UP (ref 42.2–75.2)
NRBC BLD AUTO-RTO: 0 /100 WBCS — SIGNIFICANT CHANGE UP (ref 0–0)
PLATELET # BLD AUTO: 261 K/UL — SIGNIFICANT CHANGE UP (ref 130–400)
PMV BLD: 10.4 FL — SIGNIFICANT CHANGE UP (ref 7.4–10.4)
POTASSIUM SERPL-MCNC: 4.2 MMOL/L — SIGNIFICANT CHANGE UP (ref 3.5–5)
POTASSIUM SERPL-SCNC: 4.2 MMOL/L — SIGNIFICANT CHANGE UP (ref 3.5–5)
RBC # BLD: 4.08 M/UL — LOW (ref 4.2–5.4)
RBC # FLD: 13.3 % — SIGNIFICANT CHANGE UP (ref 11.5–14.5)
S PNEUM AG UR QL: NEGATIVE — SIGNIFICANT CHANGE UP
SODIUM SERPL-SCNC: 134 MMOL/L — LOW (ref 135–146)
WBC # BLD: 6.78 K/UL — SIGNIFICANT CHANGE UP (ref 4.8–10.8)
WBC # FLD AUTO: 6.78 K/UL — SIGNIFICANT CHANGE UP (ref 4.8–10.8)

## 2025-03-19 PROCEDURE — 99232 SBSQ HOSP IP/OBS MODERATE 35: CPT

## 2025-03-19 PROCEDURE — 99233 SBSQ HOSP IP/OBS HIGH 50: CPT

## 2025-03-19 RX ORDER — CEFPODOXIME PROXETIL 200 MG/1
200 TABLET, FILM COATED ORAL EVERY 12 HOURS
Refills: 0 | Status: COMPLETED | OUTPATIENT
Start: 2025-03-19 | End: 2025-03-21

## 2025-03-19 RX ADMIN — ENOXAPARIN SODIUM 40 MILLIGRAM(S): 100 INJECTION SUBCUTANEOUS at 21:24

## 2025-03-19 RX ADMIN — FUROSEMIDE 40 MILLIGRAM(S): 10 INJECTION INTRAMUSCULAR; INTRAVENOUS at 06:50

## 2025-03-19 RX ADMIN — Medication 100 MILLIGRAM(S): at 11:22

## 2025-03-19 RX ADMIN — FOLIC ACID 1 MILLIGRAM(S): 1 TABLET ORAL at 11:21

## 2025-03-19 RX ADMIN — CEFPODOXIME PROXETIL 200 MILLIGRAM(S): 200 TABLET, FILM COATED ORAL at 17:52

## 2025-03-19 NOTE — PROGRESS NOTE ADULT - SUBJECTIVE AND OBJECTIVE BOX
Subjective/Objective:     INTERVAL HISTORY  seen and examined this am. she is feeling better, breathing improved. TTE with decreased LVSF 25%, plan for lexiscan nuclear this am     DATE OF SERVICE: 03-19-25    s. Patient denies chest pain or shortness of breath, and reports feeling better.  Review of symptoms otherwise negative.    acetaminophen     Tablet .. 650 milliGRAM(s) Oral every 6 hours PRN  benzonatate 100 milliGRAM(s) Oral every 8 hours PRN  cefpodoxime 200 milliGRAM(s) Oral every 12 hours  enoxaparin Injectable 40 milliGRAM(s) SubCutaneous every 24 hours  folic acid 1 milliGRAM(s) Oral daily  furosemide    Tablet 40 milliGRAM(s) Oral daily  regadenoson Injectable 0.4 milliGRAM(s) IV Push once  thiamine 100 milliGRAM(s) Oral daily                            11.6   6.78  )-----------( 261      ( 19 Mar 2025 06:13 )             35.5       Hemoglobin: 11.6 g/dL (03-19 @ 06:13)  Hemoglobin: 11.6 g/dL (03-18 @ 06:14)  Hemoglobin: 12.8 g/dL (03-17 @ 07:02)  Hemoglobin: 12.3 g/dL (03-16 @ 11:10)      03-19    134[L]  |  101  |  10  ----------------------------<  93  4.2   |  23  |  0.8    Ca    9.0      19 Mar 2025 06:13  Mg     2.0     03-18      Creatinine Trend: 0.8<--, 0.8<--, 0.8<--, 0.8<--, 0.8<--    COAGS:           T(C): 36.6 (03-19-25 @ 13:38), Max: 37 (03-18-25 @ 21:00)  HR: 89 (03-19-25 @ 13:38) (83 - 99)  BP: 104/68 (03-19-25 @ 13:38) (104/68 - 133/93)  RR: 18 (03-19-25 @ 13:38) (16 - 18)  SpO2: 99% (03-19-25 @ 13:38) (97% - 100%)  Wt(kg): --    I&O's Summary    18 Mar 2025 07:01  -  19 Mar 2025 07:00  --------------------------------------------------------  IN: 0 mL / OUT: 1950 mL / NET: -1950 mL    19 Mar 2025 07:01  -  19 Mar 2025 13:42  --------------------------------------------------------  IN: 0 mL / OUT: 650 mL / NET: -650 mL                        Diagnostic testing:  < from: TTE Echo Complete w/o Contrast w/ Doppler (03.17.25 @ 07:18) >  Summary:   1. Moderately to severely decreased global left ventricular systolic   function.   2. LV Ejection Fraction by Veliz's Method with a biplane EF of 25 %.   3. Moderately increased left ventricular internal cavity size.   4. Spectral Doppler shows pseudonormal pattern of left ventricular   myocardialfilling (Grade II diastolic dysfunction).   5. Normal right ventricular size and function.   6. Normal left atrial size.   7. Normal right atrial size.   8. Mild mitral valve regurgitation.   9. Adequate TR velocity was not obtained to accurately assess RVSP.  10. There is no evidence of pericardial effusion.    PHYSICIAN INTERPRETATION:  Left Ventricle: The left ventricular internal cavity size is moderately   increased. There is no left ventricular hypertrophy. Global LV systolic   function wasmoderately to severely decreased. Spectral Doppler shows   pseudonormal pattern of left ventricular myocardial filling (Grade II   diastolic dysfunction).  Right Ventricle: Normal right ventricular size and function. The right   ventricular size is normal. RV wall thickness is normal. RV systolic   function is normal. TV S' 0.1 m/s.  Left Atrium: Normal left atrial size.  Right Atrium: Normal right atrial size.  Pericardium: There is no evidence of pericardial effusion.  Mitral Valve: No evidenceof mitral stenosis. Mild mitral valve   regurgitation is seen.  Tricuspid Valve: Structurally normal tricuspid valve, with normal leaflet   excursion. No tricuspid regurgitation is visualized. Adequate TR velocity   was not obtained to accurately assess RVSP.  Aortic Valve: The aortic valve is trileaflet. No evidence of aortic   stenosis. No evidence of aortic valve regurgitation is seen.  Pulmonic Valve: The pulmonic valve was not well visualized. No indication   of pulmonic valve regurgitation.  Aorta: The aortic root and ascending aorta are structurally normal, with   no evidence of dilitation.  Venous: The inferior vena cava is normal. The inferior vena cava was   normal sized, with respiratory size variation less than 50%.    < end of copied text >    < from: NM Nuclear Stress Pharmacologic Multiple (03.18.25 @ 12:09) >  Impression:  1. NORMAL LEXISCAN / REST MYOCARDIAL PERFUSION SPECT TOMOGRAPHY, WITH NO   EVIDENCE FOR ISCHEMIA DURING LEXISCAN INFUSION.  2. DILATED LEFT VENTRICLE WITH MODERATE GLOBAL HYPOKINESIS. ALL WALLS OF   THE LEFT VENTRICLE THICKEN.  3. LEFT VENTRICULAR EJECTION FRACTION OF  36 % WHICH IS LOW. WALL MOTION   ANALYSIS SUGGESTS LVEF OF 30-35%.    --- End of Report ---            BLAYNE BRUNNER MD; Attending Radiologist  This document has been electronically signed. Mar 18 197685:[id number]    < end of copied text >      Assessment and Plan:      45 y/o f with no significant pmhx who presents complaining of a 4 day hx of orthopnea associated with chest tightness when taking deep breaths. endorses + FHx of CHF (Mother, Grandparent)    CT/PE: no evidence of PE, small bilateral pleural effusion  TTE 03/17/25: LVEF 25%, G2DD    # Acute HFrEF 25% -  undefined etiology  - pt c/o recent onset of orthopnea   - she drinks chronically 3 shots a night, - pregnancy negative  - trop WNL, ecg non-ischemic  - TTE with decreased LVSF 25%,   - daily home weight monitoring   - cardiac MRI today  - will benefit from Lifevest on discharge for SCD pppx  - re-ECHO outpatient in 3 months  - thiamine, folate  - etoh cessation   S/P Lexiscan: No evidence for ischemia  - Continue Lasix 40mg IVP qd  - Start Metoprolol Tartrate 12.5 mg daily  Discussed with Dr. Harman

## 2025-03-19 NOTE — PHARMACOTHERAPY INTERVENTION NOTE - COMMENTS
Recommended to discontinue azithromycin as patient got 3 days for CAP and Legionella UA is negative.
Consistent with ID note, recommended to adjust the azithromycin order end date to 3/20 to allow for 5 days of azithromycin therapy.    Daly Hannah, PharmD, BCIDP  Clinical Pharmacy Specialist, Infectious Diseases  Tele-Antimicrobial Stewardship Program (Tele-ASP)  Available on [employer] Teams  
Consistent with ID note, recommended to adjust the ceftriaxone order end date to 3/20 to allow for 5 days of ceftriaxone therapy.    Daly Hannah, PharmD, BCIDP  Clinical Pharmacy Specialist, Infectious Diseases  Tele-Antimicrobial Stewardship Program (Tele-ASP)  Available on [employer] Teams

## 2025-03-19 NOTE — PROGRESS NOTE ADULT - ASSESSMENT
45 y/o F with no significant pmhx who presents complaining of a 4 day hx of orthopnea associated with chest pain while taking deep breaths, non productive cough and myalgias.    #New onset acute systolic congestive heart failure, reduced ejection fraction EF 25%  Cardiology consulted  telemetry   CTA- No evidence of pulmonary embolism. Bibasilar interlobular septal thickening and groundglass opacities most prominent in the left lower lobe. Small bilateral pleural effusions. Findings may reflect pulmonary edema  Lasix 40mg IVP QD  Strict Is and Os  Fluid restriction  Standing weights  TSH and free T4 within normal limits  Cardiac MRI  Possibly dilated cardiomyopathy due to ethanol use, though cardiac silhouette not enlarged on CT chest    Echocardiogram 3/17/2025   1. Moderately to severely decreased global left ventricular systolic   function.   2. LV Ejection Fraction by Veliz's Method with a biplane EF of 25 %.   3. Moderately increased left ventricular internal cavity size.   4. Spectral Doppler shows pseudonormal pattern of left ventricular   myocardial filling (Grade II diastolic dysfunction).   5. Normal right ventricular size and function.   6. Normal left atrial size.   7. Normal right atrial size.   8. Mild mitral valve regurgitation.   9. Adequate TR velocity was not obtained to accurately assess RVSP.  10. There is no evidence of pericardial effusion.    #Community acquired LLL pneumonia  #Gram positive bacteremia  Infectious disease consulted  As seen on CT chest  Blood cultures 3/16 1 of 2 growing coagulase negative staff by PCR  Blood cultures 3/18 ordered  strep pneumo and legionella pending  Ceftriaxone 1g QD  Azithromycin 500mg QD for 3 days  One dose of vancomycin ordered, but discontinued as MRSA nares negative    #Ethanol use  #Thiamine and folic acid deficiency  UDS negative  Monitor for etOH withdrawal symptoms  Thiamine 100mg daily and folic acid 1mg PO    CHG ordered  DVT prophylaxis: lovenox  Diet: DASH  Code status: Full

## 2025-03-19 NOTE — PROGRESS NOTE ADULT - SUBJECTIVE AND OBJECTIVE BOX
SAMMIE DIANE 44y Female  MRN#: [id number]   CODE STATUS:________      SUBJECTIVE  Patient is a 44y old Female who presents with a chief complaint of Dyspnea (18 Mar 2025 11:12)  Currently admitted to medicine with the primary diagnosis of Pneumonia      Today is hospital day 3d, and this morning she is           OBJECTIVE  PAST MEDICAL & SURGICAL HISTORY  Fibroids    No significant past surgical history      ALLERGIES:  Vicodin (Unknown)  Nuts (Anaphylaxis)    MEDICATIONS:  STANDING MEDICATIONS  cefpodoxime 200 milliGRAM(s) Oral every 12 hours  enoxaparin Injectable 40 milliGRAM(s) SubCutaneous every 24 hours  folic acid 1 milliGRAM(s) Oral daily  furosemide    Tablet 40 milliGRAM(s) Oral daily  regadenoson Injectable 0.4 milliGRAM(s) IV Push once  thiamine 100 milliGRAM(s) Oral daily    PRN MEDICATIONS  acetaminophen     Tablet .. 650 milliGRAM(s) Oral every 6 hours PRN  benzonatate 100 milliGRAM(s) Oral every 8 hours PRN      VITAL SIGNS: Last 24 Hours  T(C): 36.7 (19 Mar 2025 04:59), Max: 37 (18 Mar 2025 21:00)  T(F): 98.1 (19 Mar 2025 04:59), Max: 98.6 (18 Mar 2025 21:00)  HR: 89 (19 Mar 2025 04:59) (83 - 99)  BP: 111/72 (19 Mar 2025 04:59) (111/72 - 133/93)  BP(mean): --  RR: 18 (19 Mar 2025 04:59) (16 - 18)  SpO2: 97% (19 Mar 2025 04:59) (97% - 100%)    LABS:                        11.6   6.78  )-----------( 261      ( 19 Mar 2025 06:13 )             35.5     03-19    134[L]  |  101  |  10  ----------------------------<  93  4.2   |  23  |  0.8    Ca    9.0      19 Mar 2025 06:13  Mg     2.0     03-18        Urinalysis Basic - ( 19 Mar 2025 06:13 )    Color: x / Appearance: x / SG: x / pH: x  Gluc: 93 mg/dL / Ketone: x  / Bili: x / Urobili: x   Blood: x / Protein: x / Nitrite: x   Leuk Esterase: x / RBC: x / WBC x   Sq Epi: x / Non Sq Epi: x / Bacteria: x            Culture - Blood (collected 16 Mar 2025 15:42)  Source: Blood Blood-Peripheral  Preliminary Report (18 Mar 2025 22:01):    No growth at 48 Hours    Culture - Blood (collected 16 Mar 2025 15:42)  Source: Blood Blood-Peripheral  Gram Stain (18 Mar 2025 08:06):    Growth in aerobic bottle: Gram Positive Cocci in Clusters  Final Report (18 Mar 2025 22:29):    Growth in aerobic bottle: Staphylococcus capitis    Isolation of Coagulase negative Staphylococcus from single blood culture    sets may represent    contamination. Contact the Microbiology Department at [phone] if    susceptibility testing is needed.    clinically indicated.    Direct identification is available within approximately 3-5    hours either by Blood Panel Multiplexed PCR or Direct    MALDI-TOF. Details: [email or web address]  Organism: Blood Culture PCR (18 Mar 2025 22:29)  Organism: Blood Culture PCR (18 Mar 2025 22:29)          RADIOLOGY:      PHYSICAL EXAM:    GENERAL: NAD, well-developed, AAOx3  HEENT:  Atraumatic, Normocephalic. EOMI  PULMONARY: Clear to auscultation bilaterally; No wheeze  CARDIOVASCULAR: Regular rate and rhythm; No murmurs, rubs, or gallops  GASTROINTESTINAL: Soft, Nontender, Nondistended; Bowel sounds present  MUSCULOSKELETAL:  2+ Peripheral Pulses, No clubbing, cyanosis, or edema  NEUROLOGY: non-focal  SKIN: No rashes or lesions

## 2025-03-20 PROBLEM — Z00.00 ENCOUNTER FOR PREVENTIVE HEALTH EXAMINATION: Status: ACTIVE | Noted: 2025-03-20

## 2025-03-20 LAB
ALBUMIN SERPL ELPH-MCNC: 4.2 G/DL — SIGNIFICANT CHANGE UP (ref 3.5–5.2)
ALP SERPL-CCNC: 79 U/L — SIGNIFICANT CHANGE UP (ref 30–115)
ALT FLD-CCNC: 20 U/L — SIGNIFICANT CHANGE UP (ref 0–41)
ANION GAP SERPL CALC-SCNC: 10 MMOL/L — SIGNIFICANT CHANGE UP (ref 7–14)
AST SERPL-CCNC: 18 U/L — SIGNIFICANT CHANGE UP (ref 0–41)
BASOPHILS # BLD AUTO: 0.03 K/UL — SIGNIFICANT CHANGE UP (ref 0–0.2)
BASOPHILS NFR BLD AUTO: 0.5 % — SIGNIFICANT CHANGE UP (ref 0–1)
BILIRUB SERPL-MCNC: <0.2 MG/DL — SIGNIFICANT CHANGE UP (ref 0.2–1.2)
BUN SERPL-MCNC: 12 MG/DL — SIGNIFICANT CHANGE UP (ref 10–20)
CALCIUM SERPL-MCNC: 9.5 MG/DL — SIGNIFICANT CHANGE UP (ref 8.4–10.5)
CHLORIDE SERPL-SCNC: 103 MMOL/L — SIGNIFICANT CHANGE UP (ref 98–110)
CO2 SERPL-SCNC: 24 MMOL/L — SIGNIFICANT CHANGE UP (ref 17–32)
CREAT SERPL-MCNC: 0.8 MG/DL — SIGNIFICANT CHANGE UP (ref 0.7–1.5)
EGFR: 94 ML/MIN/1.73M2 — SIGNIFICANT CHANGE UP
EGFR: 94 ML/MIN/1.73M2 — SIGNIFICANT CHANGE UP
EOSINOPHIL # BLD AUTO: 0.13 K/UL — SIGNIFICANT CHANGE UP (ref 0–0.7)
EOSINOPHIL NFR BLD AUTO: 2.1 % — SIGNIFICANT CHANGE UP (ref 0–8)
GLUCOSE SERPL-MCNC: 97 MG/DL — SIGNIFICANT CHANGE UP (ref 70–99)
HCT VFR BLD CALC: 38.4 % — SIGNIFICANT CHANGE UP (ref 37–47)
HGB BLD-MCNC: 12.5 G/DL — SIGNIFICANT CHANGE UP (ref 12–16)
HIV 1+2 AB+HIV1 P24 AG SERPL QL IA: SIGNIFICANT CHANGE UP
IMM GRANULOCYTES NFR BLD AUTO: 0.2 % — SIGNIFICANT CHANGE UP (ref 0.1–0.3)
LYMPHOCYTES # BLD AUTO: 1.6 K/UL — SIGNIFICANT CHANGE UP (ref 1.2–3.4)
LYMPHOCYTES # BLD AUTO: 26.1 % — SIGNIFICANT CHANGE UP (ref 20.5–51.1)
MAGNESIUM SERPL-MCNC: 1.9 MG/DL — SIGNIFICANT CHANGE UP (ref 1.8–2.4)
MCHC RBC-ENTMCNC: 28.5 PG — SIGNIFICANT CHANGE UP (ref 27–31)
MCHC RBC-ENTMCNC: 32.6 G/DL — SIGNIFICANT CHANGE UP (ref 32–37)
MCV RBC AUTO: 87.5 FL — SIGNIFICANT CHANGE UP (ref 81–99)
MONOCYTES # BLD AUTO: 0.6 K/UL — SIGNIFICANT CHANGE UP (ref 0.1–0.6)
MONOCYTES NFR BLD AUTO: 9.8 % — HIGH (ref 1.7–9.3)
NEUTROPHILS # BLD AUTO: 3.77 K/UL — SIGNIFICANT CHANGE UP (ref 1.4–6.5)
NEUTROPHILS NFR BLD AUTO: 61.3 % — SIGNIFICANT CHANGE UP (ref 42.2–75.2)
NRBC BLD AUTO-RTO: 0 /100 WBCS — SIGNIFICANT CHANGE UP (ref 0–0)
PLATELET # BLD AUTO: 292 K/UL — SIGNIFICANT CHANGE UP (ref 130–400)
PMV BLD: 10.2 FL — SIGNIFICANT CHANGE UP (ref 7.4–10.4)
POTASSIUM SERPL-MCNC: 4.6 MMOL/L — SIGNIFICANT CHANGE UP (ref 3.5–5)
POTASSIUM SERPL-SCNC: 4.6 MMOL/L — SIGNIFICANT CHANGE UP (ref 3.5–5)
PROT SERPL-MCNC: 7.2 G/DL — SIGNIFICANT CHANGE UP (ref 6–8)
RBC # BLD: 4.39 M/UL — SIGNIFICANT CHANGE UP (ref 4.2–5.4)
RBC # FLD: 13.5 % — SIGNIFICANT CHANGE UP (ref 11.5–14.5)
SODIUM SERPL-SCNC: 137 MMOL/L — SIGNIFICANT CHANGE UP (ref 135–146)
WBC # BLD: 6.14 K/UL — SIGNIFICANT CHANGE UP (ref 4.8–10.8)
WBC # FLD AUTO: 6.14 K/UL — SIGNIFICANT CHANGE UP (ref 4.8–10.8)

## 2025-03-20 PROCEDURE — 99232 SBSQ HOSP IP/OBS MODERATE 35: CPT

## 2025-03-20 PROCEDURE — 99233 SBSQ HOSP IP/OBS HIGH 50: CPT

## 2025-03-20 RX ORDER — METOPROLOL SUCCINATE 50 MG/1
12.5 TABLET, EXTENDED RELEASE ORAL DAILY
Refills: 0 | Status: DISCONTINUED | OUTPATIENT
Start: 2025-03-20 | End: 2025-03-24

## 2025-03-20 RX ADMIN — CEFPODOXIME PROXETIL 200 MILLIGRAM(S): 200 TABLET, FILM COATED ORAL at 17:45

## 2025-03-20 RX ADMIN — Medication 100 MILLIGRAM(S): at 12:41

## 2025-03-20 RX ADMIN — FOLIC ACID 1 MILLIGRAM(S): 1 TABLET ORAL at 12:41

## 2025-03-20 RX ADMIN — FUROSEMIDE 40 MILLIGRAM(S): 10 INJECTION INTRAMUSCULAR; INTRAVENOUS at 06:06

## 2025-03-20 RX ADMIN — CEFPODOXIME PROXETIL 200 MILLIGRAM(S): 200 TABLET, FILM COATED ORAL at 06:06

## 2025-03-20 RX ADMIN — ENOXAPARIN SODIUM 40 MILLIGRAM(S): 100 INJECTION SUBCUTANEOUS at 21:06

## 2025-03-20 NOTE — DIETITIAN INITIAL EVALUATION ADULT - PERTINENT LABORATORY DATA
03-20    137  |  103  |  12  ----------------------------<  97  4.6   |  24  |  0.8    Ca    9.5      20 Mar 2025 06:51  Mg     1.9     03-20    TPro  7.2  /  Alb  4.2  /  TBili  <0.2  /  DBili  x   /  AST  18  /  ALT  20  /  AlkPhos  79  03-20                            12.5   6.14  )-----------( 292      ( 20 Mar 2025 06:51 )             38.4

## 2025-03-20 NOTE — PROGRESS NOTE ADULT - NS ATTEND AMEND GEN_ALL_CORE FT
Agree with MELIDA Fermin    Start Metoprolol 12.5 mg po q day  Pending cardiac MRI Agree with NP Jeny    Start Metoprolol 12.5 mg po q day  Cardiac MRI suggestive of non ischemic cardiomyoapthy  Had episodes of NSVT on telemetry  Start Metoprolol 12. 5 mg po q day.  Will require Life Vest due to non ischemic cardiomyopathy with evidence of NSVT on telemetry.  D/W patient in detail.  Will require CHF follow up as outpatient in 1-2 weeks.   Continue Lasix as outpatient.   Her low BP prohibits use of ACE or ARNI at the current time.

## 2025-03-20 NOTE — DIETITIAN INITIAL EVALUATION ADULT - PERTINENT MEDS FT
MEDICATIONS  (STANDING):  cefpodoxime 200 milliGRAM(s) Oral every 12 hours  enoxaparin Injectable 40 milliGRAM(s) SubCutaneous every 24 hours  folic acid 1 milliGRAM(s) Oral daily  furosemide    Tablet 40 milliGRAM(s) Oral daily  metoprolol succinate ER 12.5 milliGRAM(s) Oral daily  regadenoson Injectable 0.4 milliGRAM(s) IV Push once  thiamine 100 milliGRAM(s) Oral daily    MEDICATIONS  (PRN):  acetaminophen     Tablet .. 650 milliGRAM(s) Oral every 6 hours PRN Mild Pain (1 - 3)  benzonatate 100 milliGRAM(s) Oral every 8 hours PRN Cough

## 2025-03-20 NOTE — PROGRESS NOTE ADULT - SUBJECTIVE AND OBJECTIVE BOX
Goal Outcome Evaluation:  Plan of Care Reviewed With: patient        Progress: no change  Outcome Evaluation: See assessment notes. Mild RLL expiratory wheezes. Denies pain or needs. VSS. IV steroids resumed.   SAMMIE DIANE 44y Female  MRN#: [id number]     SUBJECTIVE  Patient is a 44y old Female who presents with a chief complaint of Dyspnea (18 Mar 2025 11:12)  Currently admitted to medicine with the primary diagnosis of Pneumonia              OBJECTIVE  PAST MEDICAL & SURGICAL HISTORY  Fibroids    No significant past surgical history      ALLERGIES:  Vicodin (Unknown)  Nuts (Anaphylaxis)    MEDICATIONS:  STANDING MEDICATIONS  cefpodoxime 200 milliGRAM(s) Oral every 12 hours  enoxaparin Injectable 40 milliGRAM(s) SubCutaneous every 24 hours  folic acid 1 milliGRAM(s) Oral daily  furosemide    Tablet 40 milliGRAM(s) Oral daily  regadenoson Injectable 0.4 milliGRAM(s) IV Push once  thiamine 100 milliGRAM(s) Oral daily    PRN MEDICATIONS  acetaminophen     Tablet .. 650 milliGRAM(s) Oral every 6 hours PRN  benzonatate 100 milliGRAM(s) Oral every 8 hours PRN      VITAL SIGNS: Last 24 Hours  T(C): 36.7 (19 Mar 2025 04:59), Max: 37 (18 Mar 2025 21:00)  T(F): 98.1 (19 Mar 2025 04:59), Max: 98.6 (18 Mar 2025 21:00)  HR: 89 (19 Mar 2025 04:59) (83 - 99)  BP: 111/72 (19 Mar 2025 04:59) (111/72 - 133/93)  BP(mean): --  RR: 18 (19 Mar 2025 04:59) (16 - 18)  SpO2: 97% (19 Mar 2025 04:59) (97% - 100%)    LABS:                        11.6   6.78  )-----------( 261      ( 19 Mar 2025 06:13 )             35.5     03-19    134[L]  |  101  |  10  ----------------------------<  93  4.2   |  23  |  0.8    Ca    9.0      19 Mar 2025 06:13  Mg     2.0     03-18        Urinalysis Basic - ( 19 Mar 2025 06:13 )    Color: x / Appearance: x / SG: x / pH: x  Gluc: 93 mg/dL / Ketone: x  / Bili: x / Urobili: x   Blood: x / Protein: x / Nitrite: x   Leuk Esterase: x / RBC: x / WBC x   Sq Epi: x / Non Sq Epi: x / Bacteria: x            Culture - Blood (collected 16 Mar 2025 15:42)  Source: Blood Blood-Peripheral  Preliminary Report (18 Mar 2025 22:01):    No growth at 48 Hours    Culture - Blood (collected 16 Mar 2025 15:42)  Source: Blood Blood-Peripheral  Gram Stain (18 Mar 2025 08:06):    Growth in aerobic bottle: Gram Positive Cocci in Clusters  Final Report (18 Mar 2025 22:29):    Growth in aerobic bottle: Staphylococcus capitis    Isolation of Coagulase negative Staphylococcus from single blood culture    sets may represent    contamination. Contact the Microbiology Department at [phone] if    susceptibility testing is needed.    clinically indicated.    Direct identification is available within approximately 3-5    hours either by Blood Panel Multiplexed PCR or Direct    MALDI-TOF. Details: [email or web address]  Organism: Blood Culture PCR (18 Mar 2025 22:29)  Organism: Blood Culture PCR (18 Mar 2025 22:29)          RADIOLOGY:      PHYSICAL EXAM:    GENERAL: NAD, well-developed  HEENT:  Atraumatic, Normocephalic  PULMONARY: Clear to auscultation bilaterally  CARDIOVASCULAR: Regular rate and rhythm  GASTROINTESTINAL: Soft, Nontender, Nondistended; Bowel sounds present  MUSCULOSKELETAL:  2+ Peripheral Pulses, No clubbing, cyanosis, or edema  NEUROLOGY: non-focal  SKIN: No rashes or lesions

## 2025-03-20 NOTE — DIETITIAN INITIAL EVALUATION ADULT - ORAL INTAKE PTA/DIET HISTORY
pt consumed a regular diet PTA with good po intake. NKFA Or intolerances. pt denies any significant weight changes.     presently on a DASH/TLC diet tolerating well consumes >75% of meals,

## 2025-03-20 NOTE — DIETITIAN INITIAL EVALUATION ADULT - OTHER INFO
pt is 44 year old female with hx of uterine fibroids who presents complaining of a 4 day hx of orthopnea associated with chest pain while taking deep breaths, non productive cough and myalgias. pt admitted with new onset CHF and CAP

## 2025-03-20 NOTE — PROGRESS NOTE ADULT - ASSESSMENT
43 y/o F with no significant pmhx who presents complaining of a 4 day hx of orthopnea associated with chest pain while taking deep breaths, non productive cough and myalgias.    #New onset acute systolic congestive heart failure, reduced ejection fraction EF 25%  Cardiology consulted  telemetry   CTA- No evidence of pulmonary embolism. Bibasilar interlobular septal thickening and ground glass opacities most prominent in the left lower lobe. Small bilateral pleural effusions. Findings may reflect pulmonary edema  Lasix 40mg IVP QD  Strict Is and Os  Fluid restriction  Standing weights  TSH and free T4 within normal limits  Cardiac MRI reviewed  Possibly dilated cardiomyopathy due to ethanol use, though cardiac silhouette not enlarged on CT chest    Echocardiogram 3/17/2025   1. Moderately to severely decreased global left ventricular systolic   function.   2. LV Ejection Fraction by Veliz's Method with a biplane EF of 25 %.   3. Moderately increased left ventricular internal cavity size.   4. Spectral Doppler shows pseudonormal pattern of left ventricular   myocardial filling (Grade II diastolic dysfunction).   5. Normal right ventricular size and function.   6. Normal left atrial size.   7. Normal right atrial size.   8. Mild mitral valve regurgitation.   9. Adequate TR velocity was not obtained to accurately assess RVSP.  10. There is no evidence of pericardial effusion.    #Community acquired LLL pneumonia  #Gram positive bacteremia  Infectious disease consulted  As seen on CT chest  Blood cultures 3/16 1 of 2 growing coagulase negative staff by PCR  Blood cultures 3/18 ordered  strep pneumo and legionella pending  Ceftriaxone 1g QD  Azithromycin 500mg QD for 3 days  One dose of vancomycin ordered, but discontinued as MRSA nares negative    #Ethanol use  #Thiamine and folic acid deficiency  UDS negative  Monitor for etOH withdrawal symptoms  Thiamine 100mg daily and folic acid 1mg PO    CHG ordered  DVT prophylaxis: lovenox  Diet: DASH  Code status: Full  Pending life vest

## 2025-03-20 NOTE — PROGRESS NOTE ADULT - SUBJECTIVE AND OBJECTIVE BOX
Subjective/Objective:     INTERVAL HISTORY  seen and examined this am. she is feeling better, breathing improved. TTE with decreased LVSF 25%,     DATE OF SERVICE: 03-20-25    Patient denies chest pain or shortness of breath.   Review of symptoms otherwise negative.    acetaminophen     Tablet .. 650 milliGRAM(s) Oral every 6 hours PRN  benzonatate 100 milliGRAM(s) Oral every 8 hours PRN  cefpodoxime 200 milliGRAM(s) Oral every 12 hours  enoxaparin Injectable 40 milliGRAM(s) SubCutaneous every 24 hours  folic acid 1 milliGRAM(s) Oral daily  furosemide    Tablet 40 milliGRAM(s) Oral daily  metoprolol succinate ER 12.5 milliGRAM(s) Oral daily  regadenoson Injectable 0.4 milliGRAM(s) IV Push once  thiamine 100 milliGRAM(s) Oral daily                            12.5   6.14  )-----------( 292      ( 20 Mar 2025 06:51 )             38.4       Hemoglobin: 12.5 g/dL (03-20 @ 06:51)  Hemoglobin: 11.6 g/dL (03-19 @ 06:13)  Hemoglobin: 11.6 g/dL (03-18 @ 06:14)  Hemoglobin: 12.8 g/dL (03-17 @ 07:02)  Hemoglobin: 12.3 g/dL (03-16 @ 11:10)      03-20    137  |  103  |  12  ----------------------------<  97  4.6   |  24  |  0.8    Ca    9.5      20 Mar 2025 06:51  Mg     1.9     03-20    TPro  7.2  /  Alb  4.2  /  TBili  <0.2  /  DBili  x   /  AST  18  /  ALT  20  /  AlkPhos  79  03-20    Creatinine Trend: 0.8<--, 0.8<--, 0.8<--, 0.8<--, 0.8<--, 0.8<--    COAGS:           T(C): 36.6 (03-20-25 @ 05:20), Max: 36.6 (03-19-25 @ 13:38)  HR: 83 (03-20-25 @ 05:20) (83 - 92)  BP: 117/83 (03-20-25 @ 05:20) (104/68 - 122/82)  RR: 18 (03-20-25 @ 05:20) (18 - 18)  SpO2: 100% (03-20-25 @ 05:20) (98% - 100%)  Wt(kg): --    I&O's Summary    19 Mar 2025 07:01  -  20 Mar 2025 07:00  --------------------------------------------------------  IN: 0 mL / OUT: 1400 mL / NET: -1400 mL    20 Mar 2025 07:01  -  20 Mar 2025 10:19  --------------------------------------------------------  IN: 0 mL / OUT: 700 mL / NET: -700 mL                    Diagnostic testing:  < from: TTE Echo Complete w/o Contrast w/ Doppler (03.17.25 @ 07:18) >  Summary:   1. Moderately to severely decreased global left ventricular systolic   function.   2. LV Ejection Fraction by Veliz's Method with a biplane EF of 25 %.   3. Moderately increased left ventricular internal cavity size.   4. Spectral Doppler shows pseudonormal pattern of left ventricular   myocardialfilling (Grade II diastolic dysfunction).   5. Normal right ventricular size and function.   6. Normal left atrial size.   7. Normal right atrial size.   8. Mild mitral valve regurgitation.   9. Adequate TR velocity was not obtained to accurately assess RVSP.  10. There is no evidence of pericardial effusion.    PHYSICIAN INTERPRETATION:  Left Ventricle: The left ventricular internal cavity size is moderately   increased. There is no left ventricular hypertrophy. Global LV systolic   function wasmoderately to severely decreased. Spectral Doppler shows   pseudonormal pattern of left ventricular myocardial filling (Grade II   diastolic dysfunction).  Right Ventricle: Normal right ventricular size and function. The right   ventricular size is normal. RV wall thickness is normal. RV systolic   function is normal. TV S' 0.1 m/s.  Left Atrium: Normal left atrial size.  Right Atrium: Normal right atrial size.  Pericardium: There is no evidence of pericardial effusion.  Mitral Valve: No evidenceof mitral stenosis. Mild mitral valve   regurgitation is seen.  Tricuspid Valve: Structurally normal tricuspid valve, with normal leaflet   excursion. No tricuspid regurgitation is visualized. Adequate TR velocity   was not obtained to accurately assess RVSP.  Aortic Valve: The aortic valve is trileaflet. No evidence of aortic   stenosis. No evidence of aortic valve regurgitation is seen.  Pulmonic Valve: The pulmonic valve was not well visualized. No indication   of pulmonic valve regurgitation.  Aorta: The aortic root and ascending aorta are structurally normal, with   no evidence of dilitation.  Venous: The inferior vena cava is normal. The inferior vena cava was   normal sized, with respiratory size variation less than 50%.    < end of copied text >    < from: NM Nuclear Stress Pharmacologic Multiple (03.18.25 @ 12:09) >  Impression:  1. NORMAL LEXISCAN / REST MYOCARDIAL PERFUSION SPECT TOMOGRAPHY, WITH NO   EVIDENCE FOR ISCHEMIA DURING LEXISCAN INFUSION.  2. DILATED LEFT VENTRICLE WITH MODERATE GLOBAL HYPOKINESIS. ALL WALLS OF   THE LEFT VENTRICLE THICKEN.  3. LEFT VENTRICULAR EJECTION FRACTION OF  36 % WHICH IS LOW. WALL MOTION   ANALYSIS SUGGESTS LVEF OF 30-35%.    --- End of Report ---            BLAYNE BRUNNER MD; Attending Radiologist  This document has been electronically signed. Mar 18 [id number]    < end of copied text >  < from: MR Cardiac w/wo IV Cont (03.18.25 @ 17:44) >  IMPRESSION:  1. Biventricular global hypokinesis with depressed ejection fraction (LV   EF = 28 %, RV EF = 40 %).  2. Mild dilatation of the left ventricle (LV EDVi = 103 ml/m2). Normal RV   volume (RV EDVi = 72 ml/m2).  3. LV midwall late gadolinium enhancement most pronounced at the basal   septum RV superior and inferior insertion point, nonspecific may be seen   in the setting of nonischemic cardiomyopathy  4. Trace bilateral pleural effusion, left greater than right   significantly decreased CT chest 3/16/2025    --- End of Report ---            ELI CASTRO MD; Attending Radiologist  This document has been electronically signed. Mar 19 2025  3:28PM    < end of copied text >      Assessment and Plan:      43 y/o f with no significant pmhx who presents complaining of a 4 day hx of orthopnea associated with chest tightness when taking deep breaths. endorses + FHx of CHF (Mother, Grandparent)    CT/PE: no evidence of PE, small bilateral pleural effusion  TTE 03/17/25: LVEF 25%, G2DD    s. No c/o chest pain/ sob at time of eval    # Acute HFrEF 25%   - trop WNL, ecg non-ischemic  - TTE with decreased LVSF 25%,   - daily home weight monitoring   - s/p cardiac MRI   - re-ECHO outpatient in 3 months  - etoh cessation   - s/p Lexiscan: No evidence for ischemia  - Euvolemic on exam- continue Lasix PO  - Start Metoprolol ER 12.5 mg daily  - Education on low sodium diet  - Avoid heavy lifting  - [facility] [place] [facility] [place] [facility]- Advised to postpone given significant cardiomyopathy-  - EP to set up Life Vest for discharge/ EP follow up in 1-2 weeks (prior to planned vacation of 4/11/2025)    Discussed with Dr. Harman Subjective/Objective:     INTERVAL HISTORY  seen and examined this am. she is feeling better, breathing improved. TTE with decreased LVSF 25%,     DATE OF SERVICE: 03-20-25    Patient denies chest pain or shortness of breath.   Review of symptoms otherwise negative.    acetaminophen     Tablet .. 650 milliGRAM(s) Oral every 6 hours PRN  benzonatate 100 milliGRAM(s) Oral every 8 hours PRN  cefpodoxime 200 milliGRAM(s) Oral every 12 hours  enoxaparin Injectable 40 milliGRAM(s) SubCutaneous every 24 hours  folic acid 1 milliGRAM(s) Oral daily  furosemide    Tablet 40 milliGRAM(s) Oral daily  metoprolol succinate ER 12.5 milliGRAM(s) Oral daily  regadenoson Injectable 0.4 milliGRAM(s) IV Push once  thiamine 100 milliGRAM(s) Oral daily                            12.5   6.14  )-----------( 292      ( 20 Mar 2025 06:51 )             38.4       Hemoglobin: 12.5 g/dL (03-20 @ 06:51)  Hemoglobin: 11.6 g/dL (03-19 @ 06:13)  Hemoglobin: 11.6 g/dL (03-18 @ 06:14)  Hemoglobin: 12.8 g/dL (03-17 @ 07:02)  Hemoglobin: 12.3 g/dL (03-16 @ 11:10)      03-20    137  |  103  |  12  ----------------------------<  97  4.6   |  24  |  0.8    Ca    9.5      20 Mar 2025 06:51  Mg     1.9     03-20    TPro  7.2  /  Alb  4.2  /  TBili  <0.2  /  DBili  x   /  AST  18  /  ALT  20  /  AlkPhos  79  03-20    Creatinine Trend: 0.8<--, 0.8<--, 0.8<--, 0.8<--, 0.8<--, 0.8<--    COAGS:           T(C): 36.6 (03-20-25 @ 05:20), Max: 36.6 (03-19-25 @ 13:38)  HR: 83 (03-20-25 @ 05:20) (83 - 92)  BP: 117/83 (03-20-25 @ 05:20) (104/68 - 122/82)  RR: 18 (03-20-25 @ 05:20) (18 - 18)  SpO2: 100% (03-20-25 @ 05:20) (98% - 100%)  Wt(kg): --    I&O's Summary    19 Mar 2025 07:01  -  20 Mar 2025 07:00  --------------------------------------------------------  IN: 0 mL / OUT: 1400 mL / NET: -1400 mL    20 Mar 2025 07:01  -  20 Mar 2025 10:19  --------------------------------------------------------  IN: 0 mL / OUT: 700 mL / NET: -700 mL                    Diagnostic testing:  < from: TTE Echo Complete w/o Contrast w/ Doppler (03.17.25 @ 07:18) >  Summary:   1. Moderately to severely decreased global left ventricular systolic   function.   2. LV Ejection Fraction by Veliz's Method with a biplane EF of 25 %.   3. Moderately increased left ventricular internal cavity size.   4. Spectral Doppler shows pseudonormal pattern of left ventricular   myocardialfilling (Grade II diastolic dysfunction).   5. Normal right ventricular size and function.   6. Normal left atrial size.   7. Normal right atrial size.   8. Mild mitral valve regurgitation.   9. Adequate TR velocity was not obtained to accurately assess RVSP.  10. There is no evidence of pericardial effusion.    PHYSICIAN INTERPRETATION:  Left Ventricle: The left ventricular internal cavity size is moderately   increased. There is no left ventricular hypertrophy. Global LV systolic   function wasmoderately to severely decreased. Spectral Doppler shows   pseudonormal pattern of left ventricular myocardial filling (Grade II   diastolic dysfunction).  Right Ventricle: Normal right ventricular size and function. The right   ventricular size is normal. RV wall thickness is normal. RV systolic   function is normal. TV S' 0.1 m/s.  Left Atrium: Normal left atrial size.  Right Atrium: Normal right atrial size.  Pericardium: There is no evidence of pericardial effusion.  Mitral Valve: No evidenceof mitral stenosis. Mild mitral valve   regurgitation is seen.  Tricuspid Valve: Structurally normal tricuspid valve, with normal leaflet   excursion. No tricuspid regurgitation is visualized. Adequate TR velocity   was not obtained to accurately assess RVSP.  Aortic Valve: The aortic valve is trileaflet. No evidence of aortic   stenosis. No evidence of aortic valve regurgitation is seen.  Pulmonic Valve: The pulmonic valve was not well visualized. No indication   of pulmonic valve regurgitation.  Aorta: The aortic root and ascending aorta are structurally normal, with   no evidence of dilitation.  Venous: The inferior vena cava is normal. The inferior vena cava was   normal sized, with respiratory size variation less than 50%.    < end of copied text >    < from: NM Nuclear Stress Pharmacologic Multiple (03.18.25 @ 12:09) >  Impression:  1. NORMAL LEXISCAN / REST MYOCARDIAL PERFUSION SPECT TOMOGRAPHY, WITH NO   EVIDENCE FOR ISCHEMIA DURING LEXISCAN INFUSION.  2. DILATED LEFT VENTRICLE WITH MODERATE GLOBAL HYPOKINESIS. ALL WALLS OF   THE LEFT VENTRICLE THICKEN.  3. LEFT VENTRICULAR EJECTION FRACTION OF  36 % WHICH IS LOW. WALL MOTION   ANALYSIS SUGGESTS LVEF OF 30-35%.    --- End of Report ---            BLAYNE BRUNNER MD; Attending Radiologist  This document has been electronically signed. Mar 18 [id number]    < end of copied text >  < from: MR Cardiac w/wo IV Cont (03.18.25 @ 17:44) >  IMPRESSION:  1. Biventricular global hypokinesis with depressed ejection fraction (LV   EF = 28 %, RV EF = 40 %).  2. Mild dilatation of the left ventricle (LV EDVi = 103 ml/m2). Normal RV   volume (RV EDVi = 72 ml/m2).  3. LV midwall late gadolinium enhancement most pronounced at the basal   septum RV superior and inferior insertion point, nonspecific may be seen   in the setting of nonischemic cardiomyopathy  4. Trace bilateral pleural effusion, left greater than right   significantly decreased CT chest 3/16/2025    --- End of Report ---            ELI CASTRO MD; Attending Radiologist  This document has been electronically signed. Mar 19 2025  3:28PM    < end of copied text >      Assessment and Plan:      43 y/o f with no significant pmhx who presents complaining of a 4 day hx of orthopnea associated with chest tightness when taking deep breaths. endorses + FHx of CHF (Mother, Grandparent)    CT/PE: no evidence of PE, small bilateral pleural effusion  TTE 03/17/25: LVEF 25%, G2DD    s. No c/o chest pain/ sob at time of eval    # Acute HFrEF 25%   - trop WNL, ecg non-ischemic  - TTE with decreased LVSF 25%,   - daily home weight monitoring   - s/p cardiac MRI   - re-ECHO outpatient in 3 months  - etoh cessation   - s/p Lexiscan: No evidence for ischemia  - Euvolemic on exam- continue Lasix PO  - Start Metoprolol ER 12.5 mg daily  - Education on low sodium diet  - Avoid heavy lifting  - Patient has a planned [place] in two weeks [place] [facility]- Advised to postpone given significant cardiomyopathy-  - EP to set up Life Vest for discharge/ EP follow up in 1-2 weeks (prior to planned vacation of 4/11/2025)

## 2025-03-21 ENCOUNTER — TRANSCRIPTION ENCOUNTER (OUTPATIENT)
Age: 45
End: 2025-03-21

## 2025-03-21 LAB
ALBUMIN SERPL ELPH-MCNC: 4.6 G/DL — SIGNIFICANT CHANGE UP (ref 3.5–5.2)
ALP SERPL-CCNC: 84 U/L — SIGNIFICANT CHANGE UP (ref 30–115)
ALT FLD-CCNC: 22 U/L — SIGNIFICANT CHANGE UP (ref 0–41)
ANION GAP SERPL CALC-SCNC: 13 MMOL/L — SIGNIFICANT CHANGE UP (ref 7–14)
AST SERPL-CCNC: 20 U/L — SIGNIFICANT CHANGE UP (ref 0–41)
BASOPHILS # BLD AUTO: 0.03 K/UL — SIGNIFICANT CHANGE UP (ref 0–0.2)
BASOPHILS NFR BLD AUTO: 0.4 % — SIGNIFICANT CHANGE UP (ref 0–1)
BILIRUB SERPL-MCNC: 0.3 MG/DL — SIGNIFICANT CHANGE UP (ref 0.2–1.2)
BUN SERPL-MCNC: 12 MG/DL — SIGNIFICANT CHANGE UP (ref 10–20)
CALCIUM SERPL-MCNC: 9.6 MG/DL — SIGNIFICANT CHANGE UP (ref 8.4–10.5)
CHLORIDE SERPL-SCNC: 101 MMOL/L — SIGNIFICANT CHANGE UP (ref 98–110)
CK MB BLD-MCNC: HIGH TITER
CO2 SERPL-SCNC: 25 MMOL/L — SIGNIFICANT CHANGE UP (ref 17–32)
COXSACKIE TYPE A-24: HIGH TITER
CREAT SERPL-MCNC: 0.8 MG/DL — SIGNIFICANT CHANGE UP (ref 0.7–1.5)
CULTURE RESULTS: SIGNIFICANT CHANGE UP
CV A24 IGG TITR SER IF: HIGH TITER
CV A7 AB SER-ACNC: HIGH TITER
CV A9 AB TITR FLD: HIGH TITER
CV B1 AB TITR FLD: HIGH
CV B2 AB TITR FLD: HIGH
CV B3 AB TITR FLD: HIGH
CV B4 AB TITR FLD: HIGH
CV B5 AB TITR FLD: HIGH
CV B6 AB TITR FLD: HIGH
EGFR: 94 ML/MIN/1.73M2 — SIGNIFICANT CHANGE UP
EGFR: 94 ML/MIN/1.73M2 — SIGNIFICANT CHANGE UP
EOSINOPHIL # BLD AUTO: 0.11 K/UL — SIGNIFICANT CHANGE UP (ref 0–0.7)
EOSINOPHIL NFR BLD AUTO: 1.6 % — SIGNIFICANT CHANGE UP (ref 0–8)
GLUCOSE SERPL-MCNC: 97 MG/DL — SIGNIFICANT CHANGE UP (ref 70–99)
HCT VFR BLD CALC: 42.7 % — SIGNIFICANT CHANGE UP (ref 37–47)
HGB BLD-MCNC: 13.8 G/DL — SIGNIFICANT CHANGE UP (ref 12–16)
IMM GRANULOCYTES NFR BLD AUTO: 0.1 % — SIGNIFICANT CHANGE UP (ref 0.1–0.3)
LYMPHOCYTES # BLD AUTO: 2.04 K/UL — SIGNIFICANT CHANGE UP (ref 1.2–3.4)
LYMPHOCYTES # BLD AUTO: 30.4 % — SIGNIFICANT CHANGE UP (ref 20.5–51.1)
MAGNESIUM SERPL-MCNC: 2 MG/DL — SIGNIFICANT CHANGE UP (ref 1.8–2.4)
MCHC RBC-ENTMCNC: 28 PG — SIGNIFICANT CHANGE UP (ref 27–31)
MCHC RBC-ENTMCNC: 32.3 G/DL — SIGNIFICANT CHANGE UP (ref 32–37)
MCV RBC AUTO: 86.8 FL — SIGNIFICANT CHANGE UP (ref 81–99)
MONOCYTES # BLD AUTO: 0.5 K/UL — SIGNIFICANT CHANGE UP (ref 0.1–0.6)
MONOCYTES NFR BLD AUTO: 7.4 % — SIGNIFICANT CHANGE UP (ref 1.7–9.3)
NEUTROPHILS # BLD AUTO: 4.03 K/UL — SIGNIFICANT CHANGE UP (ref 1.4–6.5)
NEUTROPHILS NFR BLD AUTO: 60.1 % — SIGNIFICANT CHANGE UP (ref 42.2–75.2)
NRBC BLD AUTO-RTO: 0 /100 WBCS — SIGNIFICANT CHANGE UP (ref 0–0)
PLATELET # BLD AUTO: 338 K/UL — SIGNIFICANT CHANGE UP (ref 130–400)
PMV BLD: 10.4 FL — SIGNIFICANT CHANGE UP (ref 7.4–10.4)
POTASSIUM SERPL-MCNC: 4.3 MMOL/L — SIGNIFICANT CHANGE UP (ref 3.5–5)
POTASSIUM SERPL-SCNC: 4.3 MMOL/L — SIGNIFICANT CHANGE UP (ref 3.5–5)
PROT SERPL-MCNC: 7.9 G/DL — SIGNIFICANT CHANGE UP (ref 6–8)
RBC # BLD: 4.92 M/UL — SIGNIFICANT CHANGE UP (ref 4.2–5.4)
RBC # FLD: 13.3 % — SIGNIFICANT CHANGE UP (ref 11.5–14.5)
SODIUM SERPL-SCNC: 139 MMOL/L — SIGNIFICANT CHANGE UP (ref 135–146)
SPECIMEN SOURCE: SIGNIFICANT CHANGE UP
T GONDII DNA BLD QL NAA+PROBE: SIGNIFICANT CHANGE UP COPIES/ML
WBC # BLD: 6.72 K/UL — SIGNIFICANT CHANGE UP (ref 4.8–10.8)
WBC # FLD AUTO: 6.72 K/UL — SIGNIFICANT CHANGE UP (ref 4.8–10.8)

## 2025-03-21 PROCEDURE — 99233 SBSQ HOSP IP/OBS HIGH 50: CPT

## 2025-03-21 PROCEDURE — 99232 SBSQ HOSP IP/OBS MODERATE 35: CPT

## 2025-03-21 RX ADMIN — CEFPODOXIME PROXETIL 200 MILLIGRAM(S): 200 TABLET, FILM COATED ORAL at 05:22

## 2025-03-21 RX ADMIN — FOLIC ACID 1 MILLIGRAM(S): 1 TABLET ORAL at 12:05

## 2025-03-21 RX ADMIN — Medication 100 MILLIGRAM(S): at 12:05

## 2025-03-21 RX ADMIN — METOPROLOL SUCCINATE 12.5 MILLIGRAM(S): 50 TABLET, EXTENDED RELEASE ORAL at 05:22

## 2025-03-21 RX ADMIN — FUROSEMIDE 40 MILLIGRAM(S): 10 INJECTION INTRAMUSCULAR; INTRAVENOUS at 05:22

## 2025-03-21 RX ADMIN — ENOXAPARIN SODIUM 40 MILLIGRAM(S): 100 INJECTION SUBCUTANEOUS at 21:16

## 2025-03-21 NOTE — DISCHARGE NOTE PROVIDER - NSDCFUSCHEDAPPT_GEN_ALL_CORE_FT
[facility]  ELECTROPH [address]  Scheduled Appointment: 03/26/2025     Orly Shipley  [facility]  ELECTROPH [address]  Scheduled Appointment: 03/26/2025    Cruz Harman  [facility]  CARDIOLOGY [address]  Scheduled Appointment: 04/07/2025    Tristan Damon  [facility]  HEARTFAIL [address]  Scheduled Appointment: 04/10/2025

## 2025-03-21 NOTE — CHART NOTE - NSCHARTNOTEFT_GEN_A_CORE
Follow appt made with EPS Dr. Shipley  3/26/2025 at 1:30pm   [address]    Life Vest to be placed before discharge,   Vest being set up by EP
< from: NM Nuclear Stress Pharmacologic Multiple (03.18.25 @ 12:09) >      ACC: [id number] EXAM:  NM NUCLEAR STRESS MULTI PHARM   ORDERED BY: STEVIE MILLS     PROCEDURE DATE:  03/18/2025          INTERPRETATION:  LEXISCAN AND REST MYOCARDIAL PERFUSION SPECT TOMOGRAPHY,   WALL MOTION ANALYSIS, LVEF CALCULATION      Reason: Chest pain, shortness of breath  8 Millicuries 99M technetium sestamibi was injected intravenously at   rest,and SPECT myocardial perfusion images were acquired over a 180   degree arc.  Then, lexiscan infusion was begun by Cardiology, and at the   endof infusion, 25 millicuries 99m technetium sestamibi was injected   intravenously, and SPECT myocardial perfusion images were acquired in the   same manner as the resting study.  On viewing a cinematic display of the planar images, there is no   significant patient motion.  On viewing the tomographic images in color and black and white, bullseye   polar map, and three-dimensional surface reconstruction, there is normal   isotope distribution throughout the myocardium on both sets of   acquisitions, with no evidence for ischemia during lexiscan infusion.  The lexiscan study was acquired as a gated study.  On viewing a cinematic   display of the frames, dilated left ventricle with moderate global   hypokinesis. All walls of the left ventricle thicken.  Analysis of the ventriculogram generates a calculated left ventricular   ejection fraction of 36 % which is low, wall motion analysis suggests   LVEF of 30-35%. In comparison, LV Ejection Fraction by Veliz's Method   with a biplane EF of 25% on 3/16/2025.    Impression:  1. NORMAL LEXISCAN / REST MYOCARDIAL PERFUSION SPECT TOMOGRAPHY, WITH NO   EVIDENCE FOR ISCHEMIA DURING LEXISCAN INFUSION.  2. DILATED LEFT VENTRICLE WITH MODERATE GLOBAL HYPOKINESIS. ALL WALLS OF   THE LEFT VENTRICLE THICKEN.  3. LEFT VENTRICULAR EJECTION FRACTION OF  36 % WHICH IS LOW. WALL MOTION   ANALYSIS SUGGESTS LVEF OF 30-35%.    --- End of Report ---            BLAYNE BRUNNER MD; Attending Radiologist  This document has been electronically signed. Mar 18 [id number]    < end of copied text >
< from: TTE Echo Complete w/o Contrast w/ Doppler (03.17.25 @ 07:18) >    Summary:   1. Moderately to severely decreased global left ventricular systolic   function.   2. LV Ejection Fraction by Veliz's Method with a biplane EF of 25 %.   3. Moderately increased left ventricular internal cavity size.   4. Spectral Doppler shows pseudonormal pattern of left ventricular   myocardialfilling (Grade II diastolic dysfunction).   5. Normal right ventricular size and function.   6. Normal left atrial size.   7. Normal right atrial size.   8. Mild mitral valve regurgitation.   9. Adequate TR velocity was not obtained to accurately assess RVSP.  10. There is no evidence of pericardial effusion.      < end of copied text >    **Please keep patient NPO p MN for Nuclear Stress Test in am 3/18/2025**
Follow appt made with Cardiology Dr. Harman  4/7/2025 at 11:30am   [address]      Follow appt made with EPS Dr. Shipley  3/26/2025 at 1:30pm   [address]
Patient with asymptomatic run of NSVT. Labs reviewed, they are ok but will get stat magnesium level. Also asking for Motrin for headache, ordered

## 2025-03-21 NOTE — DISCHARGE NOTE PROVIDER - CARE PROVIDERS DIRECT ADDRESSES
,DirectAddress_Unknown,DirectAddress_Unknown,[email or web address]

## 2025-03-21 NOTE — PROGRESS NOTE ADULT - ASSESSMENT
45 y/o f with no significant pmhx who presents complaining of a 4 day hx of orthopnea associated with chest tightness when taking deep breaths. endorses + FHx of CHF (Mother, Grandparent)    CT/PE: no evidence of PE, small bilateral pleural effusion  TTE 03/17/25: LVEF 25%, G2DD    # Acute HFrEF 25%   # NSVT  - trop WNL, ecg non-ischemic  - TTE with decreased LVSF 25%,   - daily home weight monitoring   - re-ECHO outpatient in 3 months  - etoh cessation   - s/p Lexiscan: No evidence for ischemia  - Euvolemic on exam- continue Lasix PO  - Start Metoprolol ER 12.5 mg daily  - Her low BP prohibits use of ACE or ARNI at the current time.  - continue Lasix po  - Education on low sodium diet  - Avoid heavy lifting  - Will require Life Vest due to non ischemic cardiomyopathy with evidence of NSVT on telemetry.  - [place] [facility]- Advised to postpone given significant cardiomyopathy-  - EP to set up Life Vest for discharge/ EP follow up in 1-2 weeks (prior to planned vacation of 4/11/2025)  - Will require CHF follow up as outpatient in 1-2 weeks.       Discussed with Dr Harman     45 y/o f with no significant pmhx who presents complaining of a 4 day hx of orthopnea associated with chest tightness when taking deep breaths. endorses + FHx of CHF (Mother, Grandparent)    CT/PE: no evidence of PE, small bilateral pleural effusion  TTE 03/17/25: LVEF 25%, G2DD    # Acute HFrEF 25%   # NSVT  - trop WNL, ecg non-ischemic  - TTE with decreased LVSF 25%,   - daily home weight monitoring   - etoh cessation   - s/p Lexiscan: No evidence for ischemia  - Euvolemic on exam- continue Lasix PO  - Start Metoprolol ER 12.5 mg daily  - Her low BP prohibits use of ACE or ARNI at the current time.  - continue Lasix po  - Education on low sodium diet  - Avoid heavy lifting  - patient with  non ischemic cardiomyopathy, low EF 25% and recurrent NSVT, at risk of SCD from VT/VF requiring ICD  - will place lifevest on discharge with plans for ICD outpatient if EF unimproved despite GDMT  - re-ECHO outpatient in 3 months  - Patient has a planned vacation in two weeks to [place]- Advised to postpone given significant cardiomyopathy-  - follow up with Dr Harman in 1-2weeks  - Will require CHF and EP follow up as outpatient in 2 weeks.       Discussed with Dr Harman

## 2025-03-21 NOTE — PROGRESS NOTE ADULT - ASSESSMENT
45 y/o F with no significant pmhx who presents complaining of a 4 day hx of orthopnea associated with chest pain while taking deep breaths, non productive cough and myalgias.    #New onset acute systolic congestive heart failure, reduced ejection fraction EF 25%  Cardiology following  telemetry   CTA- No evidence of pulmonary embolism. Bibasilar interlobular septal thickening and ground glass opacities most prominent in the left lower lobe. Small bilateral pleural effusions. Findings may reflect pulmonary edema  Lasix 40mg IVP QD  Strict Is and Os  Fluid restriction  Standing weights  TSH and free T4 within normal limits  Cardiac MRI reviewed  Possibly dilated cardiomyopathy due to ethanol use, though cardiac silhouette not enlarged on CT chest    Echocardiogram 3/17/2025   1. Moderately to severely decreased global left ventricular systolic   function   2. LV Ejection Fraction by Veliz's Method with a biplane EF of 25 %.   3. Moderately increased left ventricular internal cavity size.   4. Spectral Doppler shows pseudonormal pattern of left ventricular   myocardial filling (Grade II diastolic dysfunction).   5. Normal right ventricular size and function.   6. Normal left atrial size.   7. Normal right atrial size.   8. Mild mitral valve regurgitation.   9. Adequate TR velocity was not obtained to accurately assess RVSP.  10. There is no evidence of pericardial effusion.    #Community acquired LLL pneumonia  #Gram positive bacteremia  Infectious disease consulted  As seen on CT chest  Blood cultures 3/16 1 of 2 growing coagulase negative staff by PCR  Blood cultures 3/18 ordered  strep pneumo and legionella pending  Ceftriaxone 1g QD  Azithromycin 500mg QD for 3 days  One dose of vancomycin ordered, but discontinued as MRSA nares negative    #Ethanol use  #Thiamine and folic acid deficiency  UDS negative  Monitor for etOH withdrawal symptoms  Thiamine 100mg daily and folic acid 1mg PO    CHG ordered  DVT prophylaxis: lovenox  Diet: DASH  Code status: Full  Pending life vest

## 2025-03-21 NOTE — DISCHARGE NOTE PROVIDER - NSDCMRMEDTOKEN_GEN_ALL_CORE_FT
folic acid 1 mg oral tablet: 1 tab(s) orally once a day  furosemide 40 mg oral tablet: 1 tab(s) orally once a day  metoprolol succinate 25 mg oral tablet, extended release: 1 tab(s) orally once a day  thiamine 100 mg oral tablet: 1 tab(s) orally once a day

## 2025-03-21 NOTE — PROGRESS NOTE ADULT - SUBJECTIVE AND OBJECTIVE BOX
SAMMIE DIANE 44y Female  MRN#: [id number]     SUBJECTIVE  Patient is a 44y old Female who presents with a chief complaint of Dyspnea (18 Mar 2025 11:12)  Currently admitted to medicine with the primary diagnosis of Pneumonia              OBJECTIVE  PAST MEDICAL & SURGICAL HISTORY  Fibroids    No significant past surgical history      ALLERGIES:  Vicodin (Unknown)  Nuts (Anaphylaxis)    MEDICATIONS:  STANDING MEDICATIONS  cefpodoxime 200 milliGRAM(s) Oral every 12 hours  enoxaparin Injectable 40 milliGRAM(s) SubCutaneous every 24 hours  folic acid 1 milliGRAM(s) Oral daily  furosemide    Tablet 40 milliGRAM(s) Oral daily  regadenoson Injectable 0.4 milliGRAM(s) IV Push once  thiamine 100 milliGRAM(s) Oral daily    PRN MEDICATIONS  acetaminophen     Tablet .. 650 milliGRAM(s) Oral every 6 hours PRN  benzonatate 100 milliGRAM(s) Oral every 8 hours PRN      VITAL SIGNS: Last 24 Hours  T(C): 36.7 (19 Mar 2025 04:59), Max: 37 (18 Mar 2025 21:00)  T(F): 98.1 (19 Mar 2025 04:59), Max: 98.6 (18 Mar 2025 21:00)  HR: 89 (19 Mar 2025 04:59) (83 - 99)  BP: 111/72 (19 Mar 2025 04:59) (111/72 - 133/93)  BP(mean): --  RR: 18 (19 Mar 2025 04:59) (16 - 18)  SpO2: 97% (19 Mar 2025 04:59) (97% - 100%)                            13.8   6.72  )-----------( 338      ( 21 Mar 2025 06:41 )             42.7       03-21    139  |  101  |  12  ----------------------------<  97  4.3   |  25  |  0.8    Ca    9.6      21 Mar 2025 06:41  Mg     2.0     03-21    TPro  7.9  /  Alb  4.6  /  TBili  0.3  /  DBili  x   /  AST  20  /  ALT  22  /  AlkPhos  84  03-21              Urinalysis Basic - ( 21 Mar 2025 06:41 )    Color: x / Appearance: x / SG: x / pH: x  Gluc: 97 mg/dL / Ketone: x  / Bili: x / Urobili: x   Blood: x / Protein: x / Nitrite: x   Leuk Esterase: x / RBC: x / WBC x   Sq Epi: x / Non Sq Epi: x / Bacteria: x            Lactate Trend            CAPILLARY BLOOD GLUCOSE            Culture Results:   No growth at 24 hours (03-19 @ 19:19)  Culture Results:   Growth in aerobic bottle: Staphylococcus capitis  Isolation of Coagulase negative Staphylococcus from single blood culture  sets may represent  contamination. Contact the Microbiology Department at [phone] if  susceptibility testing is needed.  clinically indicated.  Direct identification is available within approximately 3-5  hours either by Blood Panel Multiplexed PCR or Direct  MALDI-TOF. Details: [email or web address] (03-16 @ 15:42)  Culture Results:   No growth at 4 days (03-16 @ 15:42)        Culture - Blood (collected 16 Mar 2025 15:42)  Source: Blood Blood-Peripheral  Preliminary Report (18 Mar 2025 22:01):    No growth at 48 Hours    Culture - Blood (collected 16 Mar 2025 15:42)  Source: Blood Blood-Peripheral  Gram Stain (18 Mar 2025 08:06):    Growth in aerobic bottle: Gram Positive Cocci in Clusters  Final Report (18 Mar 2025 22:29):    Growth in aerobic bottle: Staphylococcus capitis    Isolation of Coagulase negative Staphylococcus from single blood culture    sets may represent    contamination. Contact the Microbiology Department at [phone] if    susceptibility testing is needed.    clinically indicated.    Direct identification is available within approximately 3-5    hours either by Blood Panel Multiplexed PCR or Direct    MALDI-TOF. Details: [email or web address]  Organism: Blood Culture PCR (18 Mar 2025 22:29)  Organism: Blood Culture PCR (18 Mar 2025 22:29)          RADIOLOGY:      PHYSICAL EXAM:    GENERAL: NAD, well-developed  HEENT:  Atraumatic, Normocephalic  PULMONARY: Clear to auscultation bilaterally  CARDIOVASCULAR: Regular rate and rhythm  GASTROINTESTINAL: Soft, Nontender, Nondistended; Bowel sounds present  MUSCULOSKELETAL:  2+ Peripheral Pulses, No clubbing, cyanosis, or edema  NEUROLOGY: non-focal  SKIN: No rashes or lesions

## 2025-03-21 NOTE — DISCHARGE NOTE PROVIDER - HOSPITAL COURSE
Patient is a 45 y/o F with PMH of uterine fibroids who presents complaining of a 4 day hx of orthopnea associated with chest pain while taking deep breaths, non productive cough and myalgias. Admitted for new onset CHF and LLL pneumonia     #New onset acute congestive heart failure  - Cardiology recommended echo: showed EF of 25%  - Cardiac MRI reviewed  - Maintain telemetry  - CTA- No evidence of pulmonary embolism. Bibasilar interlobular septal thickening and ground glass opacities most prominent in the left lower lobe. Small bilateral pleural effusions. Findings may reflect pulmonary edema  - Chest x-ray: LLL PNA  - Lasix 40mg IVP QD  - Strict Is and Os and fluid restriction  - Standing weights  - Lexiscan showed no evidence of ischemia  - EP set up Life Vest for discharge   - Cardiology recommended starting metoprolol 12.5mg QD    #Community acquired LLL pneumonia  #Gram positive bacteremia   - Seen on CT chest  - MRSA negative  - Legionella negative   - Strep negative   - Infectious disease recommended switching from IV Ceftriaxone and Azithromycin to PO Vantin 200mg BID for total of 5 days from 3/16/2025  - Blood cultures from 2/19 negative at 24 hours          Patient is a 45 y/o F with PMH of uterine fibroids who presents complaining of a 4 day hx of orthopnea associated with chest pain while taking deep breaths, non productive cough and myalgias. Admitted for new onset CHF and LLL pneumonia     #New onset acute congestive heart failure  - Cardiology recommended echo: showed EF of 25%  - Cardiac MRI reviewed  - Maintain telemetry  - CTA- No evidence of pulmonary embolism. Bibasilar interlobular septal thickening and ground glass opacities most prominent in the left lower lobe. Small bilateral pleural effusions. Findings may reflect pulmonary edema  - Chest x-ray: LLL PNA  - Lasix 40mg IVP QD  - Strict Is and Os and fluid restriction  - Standing weights  - Lexiscan showed no evidence of ischemia  -Life Vest to be placed before discharge, Vest being set up by EP  - Cardiology recommended starting metoprolol 12.5mg QD  -Follow appt made with EPS Dr. Shipley  3/26/2025 at 1:30pm   Kaity Camarena      #Community acquired LLL pneumonia  #Gram positive bacteremia   - Seen on CT chest  - MRSA negative  - Legionella negative   - Strep negative   - Infectious disease recommended switching from IV Ceftriaxone and Azithromycin to PO Vantin 200mg BID for total of 5 days from 3/16/2025  - Blood cultures from 2/19 negative at 24 hours          Patient is a 43 y/o F with PMH of uterine fibroids who presents complaining of a 4 day hx of orthopnea associated with chest pain while taking deep breaths, non productive cough and myalgias. Admitted for new onset CHF and LLL pneumonia     #New onset acute congestive heart failure  - Cardiology recommended echo: showed EF of 25%  - Cardiac MRI reviewed  - Maintain telemetry  - CTA- No evidence of pulmonary embolism. Bibasilar interlobular septal thickening and ground glass opacities most prominent in the left lower lobe. Small bilateral pleural effusions. Findings may reflect pulmonary edema  - Chest x-ray: LLL PNA  - Lasix 40mg IVP QD  - Strict Is and Os and fluid restriction  - Standing weights  - Lexiscan showed no evidence of ischemia  -Life Vest placed before discharge, Vest being set up by EP  - Cardiology recommended starting metoprolol 12.5mg QD  -Follow appt made with EPS Dr. Shipley  3/26/2025 at 1:30pm   Kaity Camarena      #Community acquired LLL pneumonia  #Gram positive bacteremia   - Seen on CT chest  - MRSA negative  - Legionella negative   - Strep negative   - Infectious disease recommended switching from IV Ceftriaxone and Azithromycin to PO Vantin 200mg BID for total of 5 days from 3/16/2025  - Blood cultures from 2/19 negative at 24 hours

## 2025-03-21 NOTE — DISCHARGE NOTE PROVIDER - PROVIDER TOKENS
FREE:[LAST:[Electrophysiology],PHONE:[(   )    -],FAX:[(   )    -],ADDRESS:[[address] [place] [address]],SCHEDULEDAPPT:[03/26/2025],SCHEDULEDAPPTTIME:[01:30 PM]],FREE:[LAST:[PCP],PHONE:[(   )    -],FAX:[(   )    -],FOLLOWUP:[1 week]],PROVIDER:[TOKEN:[[id number]],FOLLOWUP:[1 week]]

## 2025-03-21 NOTE — PROGRESS NOTE ADULT - SUBJECTIVE AND OBJECTIVE BOX
Subjective/Objective:     seen and examined this am. she is feeling better, breathing improved. TTE with decreased LVSF 25%,     MEDICATIONS  (STANDING):  enoxaparin Injectable 40 milliGRAM(s) SubCutaneous every 24 hours  folic acid 1 milliGRAM(s) Oral daily  furosemide    Tablet 40 milliGRAM(s) Oral daily  metoprolol succinate ER 12.5 milliGRAM(s) Oral daily  regadenoson Injectable 0.4 milliGRAM(s) IV Push once  thiamine 100 milliGRAM(s) Oral daily    MEDICATIONS  (PRN):  acetaminophen     Tablet .. 650 milliGRAM(s) Oral every 6 hours PRN Mild Pain (1 - 3)  benzonatate 100 milliGRAM(s) Oral every 8 hours PRN Cough          Vital Signs Last 24 Hrs  T(C): 36.3 (21 Mar 2025 04:40), Max: 37.2 (20 Mar 2025 14:20)  T(F): 97.3 (21 Mar 2025 04:40), Max: 98.9 (20 Mar 2025 14:20)  HR: 86 (21 Mar 2025 04:40) (86 - 103)  BP: 108/74 (21 Mar 2025 04:40) (108/74 - 118/82)  BP(mean): --  RR: 18 (21 Mar 2025 04:40) (16 - 181)  SpO2: 99% (21 Mar 2025 04:40) (98% - 99%)      I&O's Detail    20 Mar 2025 07:01  -  21 Mar 2025 07:00  --------------------------------------------------------  IN:  Total IN: 0 mL    OUT:    Voided (mL): 2200 mL  Total OUT: 2200 mL    Total NET: -2200 mL      21 Mar 2025 07:01  -  21 Mar 2025 12:47  --------------------------------------------------------  IN:  Total IN: 0 mL    OUT:    Voided (mL): 250 mL  Total OUT: 250 mL    Total NET: -250 mL      GENERAL:  45y/o Female NAD, resting comfortably.  HEAD:  Atraumatic, Normocephalic  EYES: EOMI, PERRLA, conjunctiva and sclera clear  NECK: Supple, No JVD, no cervical lymphadenopathy, non-tender  CHEST/LUNG: Clear to auscultation bilaterally; No wheeze, rhonchi, or rales  HEART: Regular rate and rhythm; S1&S2  ABDOMEN: Soft, Nontender, Nondistended x 4 quadrants; Bowel sounds present  EXTREMITIES:   Peripheral Pulses Present, No clubbing, no cyanosis, or no edema, no calf tenderness  PSYCH: AAOx3, cooperative, appropriate  NEUROLOGY: WNL  SKIN: WNL        EKG/ TELEM:    LABS:                          13.8   6.72  )-----------( 338      ( 21 Mar 2025 06:41 )             42.7       21 Mar 2025 06:41    139    |  101    |  12     ----------------------------<  97     4.3     |  25     |  0.8    20 Mar 2025 06:51    137    |  103    |  12     ----------------------------<  97     4.6     |  24     |  0.8      Ca    9.6        21 Mar 2025 06:41  Ca    9.5        20 Mar 2025 06:51  Mg     2.0       21 Mar 2025 06:41  Mg     1.9       20 Mar 2025 06:51    TPro  7.9    /  Alb  4.6    /  TBili  0.3    /  DBili  x      /  AST  20     /  ALT  22     /  AlkPhos  84     21 Mar 2025 06:41  TPro  7.2    /  Alb  4.2    /  TBili  <0.2   /  DBili  x      /  AST  18     /  ALT  20     /  AlkPhos  79     20 Mar 2025 06:51                      Diagnostic testing:  < from: TTE Echo Complete w/o Contrast w/ Doppler (03.17.25 @ 07:18) >  Summary:   1. Moderately to severely decreased global left ventricular systolic   function.   2. LV Ejection Fraction by Veliz's Method with a biplane EF of 25 %.   3. Moderately increased left ventricular internal cavity size.   4. Spectral Doppler shows pseudonormal pattern of left ventricular   myocardialfilling (Grade II diastolic dysfunction).   5. Normal right ventricular size and function.   6. Normal left atrial size.   7. Normal right atrial size.   8. Mild mitral valve regurgitation.   9. Adequate TR velocity was not obtained to accurately assess RVSP.  10. There is no evidence of pericardial effusion.    PHYSICIAN INTERPRETATION:  Left Ventricle: The left ventricular internal cavity size is moderately   increased. There is no left ventricular hypertrophy. Global LV systolic   function wasmoderately to severely decreased. Spectral Doppler shows   pseudonormal pattern of left ventricular myocardial filling (Grade II   diastolic dysfunction).  Right Ventricle: Normal right ventricular size and function. The right   ventricular size is normal. RV wall thickness is normal. RV systolic   function is normal. TV S' 0.1 m/s.  Left Atrium: Normal left atrial size.  Right Atrium: Normal right atrial size.  Pericardium: There is no evidence of pericardial effusion.  Mitral Valve: No evidenceof mitral stenosis. Mild mitral valve   regurgitation is seen.  Tricuspid Valve: Structurally normal tricuspid valve, with normal leaflet   excursion. No tricuspid regurgitation is visualized. Adequate TR velocity   was not obtained to accurately assess RVSP.  Aortic Valve: The aortic valve is trileaflet. No evidence of aortic   stenosis. No evidence of aortic valve regurgitation is seen.  Pulmonic Valve: The pulmonic valve was not well visualized. No indication   of pulmonic valve regurgitation.  Aorta: The aortic root and ascending aorta are structurally normal, with   no evidence of dilitation.  Venous: The inferior vena cava is normal. The inferior vena cava was   normal sized, with respiratory size variation less than 50%.    < end of copied text >        Assessment and Plan:

## 2025-03-21 NOTE — DISCHARGE NOTE PROVIDER - CARE PROVIDER_API CALL
Electrophysiology,   [address]   [facility] [address]  Phone: (   )    -  Fax: (   )    -  Scheduled Appointment: 03/26/2025 01:30 PM    PCP,   Phone: (   )    -  Fax: (   )    -  Follow Up Time: 1 week    Cruz Harman  Interventional Cardiology  [address] [place] [address]  Phone: [phone]  Fax: [phone]  Follow Up Time: 1 week

## 2025-03-21 NOTE — DISCHARGE NOTE PROVIDER - NSDCCPCAREPLAN_GEN_ALL_CORE_FT
PRINCIPAL DISCHARGE DIAGNOSIS  Diagnosis: Pneumonia  Assessment and Plan of Treatment: On admission you were seen to have a left lower lobe pneumonia and the presence of gram positive bacteria in your blood. We consulted our infectious disease team to best manage your care. We followed their recommendations for treatment. You were given IV antibiotics and then switched to antibiotics by mouth. The bacteria cleared from your blood, your pneumonia started clearing and you were cleared for discharge.        SECONDARY DISCHARGE DIAGNOSES  Diagnosis: Congestive heart failure  Assessment and Plan of Treatment: While you were in the hospital you got diagnosed with new onset of congestive heart. Heart failure is when your heart is no longer able to effectively pump oxygen-rich blood to the whole body. You had an echocardiogram of your heart showing that the pumping function of your heart was reduced to 25%. You were placed on telemetry to monitor your heart. A common complication of heart failure is retaining excess fluids. Because of this, you were placed on a diuretic to help get rid of that fluid and placed on a fluid restriction. You met with electrophysiology who set up your life vest for discharge and met with the cardiology team who started you on a medication called metoprolol prior to discharge. You were cleared for discharge pending the arrival of your life vest     PRINCIPAL DISCHARGE DIAGNOSIS  Diagnosis: Pneumonia  Assessment and Plan of Treatment: On admission you were seen to have a left lower lobe pneumonia and the presence of gram positive bacteria in your blood. We consulted our infectious disease team to best manage your care. We followed their recommendations for treatment. You were given IV antibiotics and then switched to antibiotics by mouth. PO Vantin 200mg BID for total of 5 days from 3/16/2025. The bacteria cleared from your blood, your pneumonia started clearing and you were cleared for discharge.        SECONDARY DISCHARGE DIAGNOSES  Diagnosis: Congestive heart failure  Assessment and Plan of Treatment: While you were in the hospital you got diagnosed with new onset of congestive heart. Heart failure is when your heart is no longer able to effectively pump oxygen-rich blood to the whole body. You had an echocardiogram of your heart showing that the pumping function of your heart was reduced to 25%. You were placed on telemetry to monitor your heart. A common complication of heart failure is retaining excess fluids. Because of this, you were placed on a diuretic to help get rid of that fluid and placed on a fluid restriction. You met with electrophysiology who set up your life vest for discharge and met with the cardiology team who started you on a medication called metoprolol prior to discharge. You were cleared for discharge pending the arrival of your life vest  -Follow appt made with EPS Dr. Shipley  3/26/2025 at 1:30pm   Kaity Camarena

## 2025-03-21 NOTE — PROGRESS NOTE ADULT - NS ATTEND AMEND GEN_ALL_CORE FT
Non ischemic cardiomyopathy  Currently improving.  Will require Life vest on discharge and follow up with cardiology in 1-2 weeks. NP Diaz arranging.   Continue Lasix and Metoprolol  NP Diaz to provide apppointments to patient.

## 2025-03-22 LAB — T CRUZI AB SER-ACNC: SIGNIFICANT CHANGE UP

## 2025-03-22 PROCEDURE — 99232 SBSQ HOSP IP/OBS MODERATE 35: CPT

## 2025-03-22 RX ORDER — DEXTROMETHORPHAN HBR, GUAIFENESIN 20; 200 MG/10ML; MG/10ML
10 SOLUTION ORAL THREE TIMES A DAY
Refills: 0 | Status: DISCONTINUED | OUTPATIENT
Start: 2025-03-22 | End: 2025-03-24

## 2025-03-22 RX ADMIN — ENOXAPARIN SODIUM 40 MILLIGRAM(S): 100 INJECTION SUBCUTANEOUS at 21:30

## 2025-03-22 RX ADMIN — FUROSEMIDE 40 MILLIGRAM(S): 10 INJECTION INTRAMUSCULAR; INTRAVENOUS at 05:14

## 2025-03-22 RX ADMIN — Medication 100 MILLIGRAM(S): at 12:49

## 2025-03-22 RX ADMIN — METOPROLOL SUCCINATE 12.5 MILLIGRAM(S): 50 TABLET, EXTENDED RELEASE ORAL at 05:14

## 2025-03-22 RX ADMIN — DEXTROMETHORPHAN HBR, GUAIFENESIN 10 MILLILITER(S): 20; 200 SOLUTION ORAL at 21:30

## 2025-03-22 RX ADMIN — FOLIC ACID 1 MILLIGRAM(S): 1 TABLET ORAL at 12:49

## 2025-03-23 PROCEDURE — 99232 SBSQ HOSP IP/OBS MODERATE 35: CPT

## 2025-03-23 RX ADMIN — FUROSEMIDE 40 MILLIGRAM(S): 10 INJECTION INTRAMUSCULAR; INTRAVENOUS at 05:08

## 2025-03-23 RX ADMIN — ENOXAPARIN SODIUM 40 MILLIGRAM(S): 100 INJECTION SUBCUTANEOUS at 21:54

## 2025-03-23 RX ADMIN — FOLIC ACID 1 MILLIGRAM(S): 1 TABLET ORAL at 13:09

## 2025-03-23 RX ADMIN — Medication 650 MILLIGRAM(S): at 18:50

## 2025-03-23 RX ADMIN — Medication 100 MILLIGRAM(S): at 13:09

## 2025-03-23 RX ADMIN — DEXTROMETHORPHAN HBR, GUAIFENESIN 10 MILLILITER(S): 20; 200 SOLUTION ORAL at 08:48

## 2025-03-23 RX ADMIN — METOPROLOL SUCCINATE 12.5 MILLIGRAM(S): 50 TABLET, EXTENDED RELEASE ORAL at 05:06

## 2025-03-23 RX ADMIN — Medication 650 MILLIGRAM(S): at 18:24

## 2025-03-23 NOTE — PROGRESS NOTE ADULT - SUBJECTIVE AND OBJECTIVE BOX
SAMMIE DIANE 44y Female  MRN#: [id number]     SUBJECTIVE  Patient is a 44y old Female who presents with a chief complaint of Dyspnea (18 Mar 2025 11:12)  Currently admitted to medicine with the primary diagnosis of Pneumonia      OBJECTIVE  PAST MEDICAL & SURGICAL HISTORY  Fibroids    No significant past surgical history      ALLERGIES:  Vicodin (Unknown)  Nuts (Anaphylaxis)    MEDICATIONS:  STANDING MEDICATIONS  cefpodoxime 200 milliGRAM(s) Oral every 12 hours  enoxaparin Injectable 40 milliGRAM(s) SubCutaneous every 24 hours  folic acid 1 milliGRAM(s) Oral daily  furosemide    Tablet 40 milliGRAM(s) Oral daily  regadenoson Injectable 0.4 milliGRAM(s) IV Push once  thiamine 100 milliGRAM(s) Oral daily    PRN MEDICATIONS  acetaminophen     Tablet .. 650 milliGRAM(s) Oral every 6 hours PRN  benzonatate 100 milliGRAM(s) Oral every 8 hours PRN      T(C): 36.6 (23 Mar 2025 04:45), Max: 37.2 (22 Mar 2025 14:15)  T(F): 97.8 (23 Mar 2025 04:45), Max: 98.9 (22 Mar 2025 14:15)  HR: 77 (23 Mar 2025 04:45) (77 - 96)  BP: 108/70 (23 Mar 2025 04:45) (105/72 - 119/84)  BP(mean): 83 (23 Mar 2025 04:45) (83 - 83)  ABP: --  ABP(mean): --  RR: 18 (23 Mar 2025 04:45) (18 - 18)  SpO2: 100% (23 Mar 2025 04:45) (98% - 100%)    O2 Parameters below as of 22 Mar 2025 14:15  Patient On (Oxygen Delivery Method): room air                                    Lactate Trend            CAPILLARY BLOOD GLUCOSE            Culture Results:   No growth at 72 Hours (03-19 @ 19:19)  Culture Results:   Growth in aerobic bottle: Staphylococcus capitis  Isolation of Coagulase negative Staphylococcus from single blood culture  sets may represent  contamination. Contact the Microbiology Department at [phone] if  susceptibility testing is needed.  clinically indicated.  Direct identification is available within approximately 3-5  hours either by Blood Panel Multiplexed PCR or Direct  MALDI-TOF. Details: [email or web address] (03-16 @ 15:42)  Culture Results:   No growth at 5 days (03-16 @ 15:42)          Culture Results:   No growth at 24 hours (03-19 @ 19:19)  Culture Results:   Growth in aerobic bottle: Staphylococcus capitis  Isolation of Coagulase negative Staphylococcus from single blood culture  sets may represent  contamination. Contact the Microbiology Department at [phone] if  susceptibility testing is needed.  clinically indicated.  Direct identification is available within approximately 3-5  hours either by Blood Panel Multiplexed PCR or Direct  MALDI-TOF. Details: [email or web address] (03-16 @ 15:42)  Culture Results:   No growth at 4 days (03-16 @ 15:42)        Culture - Blood (collected 16 Mar 2025 15:42)  Source: Blood Blood-Peripheral  Preliminary Report (18 Mar 2025 22:01):    No growth at 48 Hours    Culture - Blood (collected 16 Mar 2025 15:42)  Source: Blood Blood-Peripheral  Gram Stain (18 Mar 2025 08:06):    Growth in aerobic bottle: Gram Positive Cocci in Clusters  Final Report (18 Mar 2025 22:29):    Growth in aerobic bottle: Staphylococcus capitis    Isolation of Coagulase negative Staphylococcus from single blood culture    sets may represent    contamination. Contact the Microbiology Department at [phone] if    susceptibility testing is needed.    clinically indicated.    Direct identification is available within approximately 3-5    hours either by Blood Panel Multiplexed PCR or Direct    MALDI-TOF. Details: [email or web address]  Organism: Blood Culture PCR (18 Mar 2025 22:29)  Organism: Blood Culture PCR (18 Mar 2025 22:29)          RADIOLOGY:      PHYSICAL EXAM:    GENERAL: NAD, well-developed  HEENT:  Atraumatic, Normocephalic  PULMONARY: Clear to auscultation bilaterally  CARDIOVASCULAR: Regular rate and rhythm  GASTROINTESTINAL: Soft, Nontender, Nondistended; Bowel sounds present  MUSCULOSKELETAL:  2+ Peripheral Pulses, No clubbing, cyanosis, or edema  NEUROLOGY: non-focal  SKIN: No rashes or lesions

## 2025-03-23 NOTE — PROGRESS NOTE ADULT - ASSESSMENT
43 y/o F with no significant pmhx who presents complaining of a 4 day hx of orthopnea associated with chest pain while taking deep breaths, non productive cough and myalgias.    #New onset acute systolic congestive heart failure, reduced ejection fraction EF 25%  Cardiology following  telemetry   CTA- No evidence of pulmonary embolism. Bibasilar interlobular septal thickening and ground glass opacities most prominent in the left lower lobe. Small bilateral pleural effusions. Findings may reflect pulmonary edema  Lasix 40mg IVP QD  Strict Is and Os  Fluid restriction  Standing weights  TSH and free T4 within normal limits  Cardiac MRI reviewed  Possibly dilated cardiomyopathy due to ethanol use, though cardiac silhouette not enlarged on CT chest    Echocardiogram 3/17/2025   1. Moderately to severely decreased global left ventricular systolic   function   2. LV Ejection Fraction by Veliz's Method with a biplane EF of 25 %.   3. Moderately increased left ventricular internal cavity size.   4. Spectral Doppler shows pseudonormal pattern of left ventricular   myocardial filling (Grade II diastolic dysfunction).   5. Normal right ventricular size and function.   6. Normal left atrial size.   7. Normal right atrial size.   8. Mild mitral valve regurgitation.   9. Adequate TR velocity was not obtained to accurately assess RVSP.  10. There is no evidence of pericardial effusion.    #Community acquired LLL pneumonia  #Gram positive bacteremia  Infectious disease consulted  As seen on CT chest  Blood cultures 3/16 1 of 2 growing coagulase negative staff by PCR  Blood cultures 3/18 ordered  strep pneumo and legionella pending  Ceftriaxone 1g QD  Azithromycin 500mg QD for 3 days  One dose of vancomycin ordered, but discontinued as MRSA nares negative    #Ethanol use  #Thiamine and folic acid deficiency  UDS negative  Monitor for etOH withdrawal symptoms  Thiamine 100mg daily and folic acid 1mg PO    CHG ordered  DVT prophylaxis: lovenox  Diet: DASH  Code status: Full  Pending life vest

## 2025-03-24 ENCOUNTER — TRANSCRIPTION ENCOUNTER (OUTPATIENT)
Age: 45
End: 2025-03-24

## 2025-03-24 VITALS
DIASTOLIC BLOOD PRESSURE: 87 MMHG | TEMPERATURE: 98 F | OXYGEN SATURATION: 100 % | HEART RATE: 81 BPM | RESPIRATION RATE: 18 BRPM | SYSTOLIC BLOOD PRESSURE: 126 MMHG

## 2025-03-24 PROCEDURE — 99239 HOSP IP/OBS DSCHRG MGMT >30: CPT

## 2025-03-24 PROCEDURE — 99233 SBSQ HOSP IP/OBS HIGH 50: CPT

## 2025-03-24 RX ORDER — METOPROLOL SUCCINATE 50 MG/1
1 TABLET, EXTENDED RELEASE ORAL
Qty: 30 | Refills: 0
Start: 2025-03-24 | End: 2025-04-22

## 2025-03-24 RX ORDER — FOLIC ACID 1 MG/1
1 TABLET ORAL
Qty: 30 | Refills: 0
Start: 2025-03-24 | End: 2025-04-22

## 2025-03-24 RX ORDER — METOPROLOL SUCCINATE 50 MG/1
25 TABLET, EXTENDED RELEASE ORAL DAILY
Refills: 0 | Status: DISCONTINUED | OUTPATIENT
Start: 2025-03-24 | End: 2025-03-24

## 2025-03-24 RX ORDER — FUROSEMIDE 10 MG/ML
1 INJECTION INTRAMUSCULAR; INTRAVENOUS
Qty: 30 | Refills: 0
Start: 2025-03-24 | End: 2025-04-22

## 2025-03-24 RX ADMIN — METOPROLOL SUCCINATE 12.5 MILLIGRAM(S): 50 TABLET, EXTENDED RELEASE ORAL at 05:15

## 2025-03-24 RX ADMIN — FUROSEMIDE 40 MILLIGRAM(S): 10 INJECTION INTRAMUSCULAR; INTRAVENOUS at 05:15

## 2025-03-24 RX ADMIN — Medication 100 MILLIGRAM(S): at 11:44

## 2025-03-24 RX ADMIN — FOLIC ACID 1 MILLIGRAM(S): 1 TABLET ORAL at 11:44

## 2025-03-24 NOTE — PROGRESS NOTE ADULT - NS ATTEND AMEND GEN_ALL_CORE FT
Patient seen and examined. Pertinent labs, imaging and telemetry reviewed. I agree with the above:     Patient feeling better. No new symptoms.  -1 episode of NSVT on tele yesterday. 4 beats.   -Pt pending wearable defibrillator approval.   -Pt is hesitant to go home without WCD.   -Increase Toprol 12.5 to 25mg PO daily.   -Continue to monitor for NSVT on tele.   -Continue other current meds.   -Will update once WCD approval decision made.

## 2025-03-24 NOTE — DISCHARGE NOTE NURSING/CASE MANAGEMENT/SOCIAL WORK - PATIENT PORTAL LINK FT
You can access the FollowMyHealth Patient Portal offered by [facility] by registering at the following website: [email or web address]. By joining [facility]’s FollowMyHealth portal, you will also be able to view your health information using other applications (apps) compatible with our system.

## 2025-03-24 NOTE — PROGRESS NOTE ADULT - SUBJECTIVE AND OBJECTIVE BOX
Subjective/Objective:     INTERVAL HISTORY  seen and examined this am, still pending insurance auth for wearable cardioverter defibrillator,  offers no acute complaints at this time, denies chest pain and sob    MEDICATIONS  (STANDING):  enoxaparin Injectable 40 milliGRAM(s) SubCutaneous every 24 hours  folic acid 1 milliGRAM(s) Oral daily  furosemide    Tablet 40 milliGRAM(s) Oral daily  metoprolol succinate ER 12.5 milliGRAM(s) Oral daily  regadenoson Injectable 0.4 milliGRAM(s) IV Push once  thiamine 100 milliGRAM(s) Oral daily    MEDICATIONS  (PRN):  acetaminophen     Tablet .. 650 milliGRAM(s) Oral every 6 hours PRN Mild Pain (1 - 3)  benzonatate 100 milliGRAM(s) Oral every 8 hours PRN Cough  guaifenesin/dextromethorphan Oral Liquid 10 milliLiter(s) Oral three times a day PRN Cough          Vital Signs Last 24 Hrs  T(C): 36.7 (24 Mar 2025 04:15), Max: 36.9 (23 Mar 2025 14:08)  T(F): 98.1 (24 Mar 2025 04:15), Max: 98.4 (23 Mar 2025 14:08)  HR: 89 (24 Mar 2025 04:15) (80 - 89)  BP: 112/76 (24 Mar 2025 04:15) (109/73 - 112/76)  BP(mean): --  RR: 18 (24 Mar 2025 04:15) (18 - 18)  SpO2: 99% (24 Mar 2025 04:15) (99% - 100%)    Parameters below as of 23 Mar 2025 14:08  Patient On (Oxygen Delivery Method): room air      I&O's Detail    23 Mar 2025 07:01  -  24 Mar 2025 07:00  --------------------------------------------------------  IN:    Oral Fluid: 677 mL  Total IN: 677 mL    OUT:    Voided (mL): 800 mL  Total OUT: 800 mL    Total NET: -123 mL      24 Mar 2025 07:01  -  24 Mar 2025 09:30  --------------------------------------------------------  IN:    Oral Fluid: 300 mL  Total IN: 300 mL    OUT:    Voided (mL): 800 mL  Total OUT: 800 mL    Total NET: -500 mL      EKG/ TELEM:    LABS:                                Diagnostic testing:  < from: NM Nuclear Stress Pharmacologic Multiple (03.18.25 @ 12:09) >  Impression:  1. NORMAL LEXISCAN / REST MYOCARDIAL PERFUSION SPECT TOMOGRAPHY, WITH NO   EVIDENCE FOR ISCHEMIA DURING LEXISCAN INFUSION.  2. DILATED LEFT VENTRICLE WITH MODERATE GLOBAL HYPOKINESIS. ALL WALLS OF   THE LEFT VENTRICLE THICKEN.  3. LEFT VENTRICULAR EJECTION FRACTION OF  36 % WHICH IS LOW. WALL MOTION   ANALYSIS SUGGESTS LVEF OF 30-35%.    --- End of Report ---    < end of copied text >  < from: TTE Echo Complete w/o Contrast w/ Doppler (03.17.25 @ 07:18) >    Summary:   1. Moderately to severely decreased global left ventricular systolic   function.   2. LV Ejection Fraction by Veliz's Method with a biplane EF of 25 %.   3. Moderately increased left ventricular internal cavity size.   4. Spectral Doppler shows pseudonormal pattern of left ventricular   myocardialfilling (Grade II diastolic dysfunction).   5. Normal right ventricular size and function.   6. Normal left atrial size.   7. Normal right atrial size.   8. Mild mitral valve regurgitation.   9. Adequate TR velocity was not obtained to accurately assess RVSP.  10. There is no evidence of pericardial effusion.    PHYSICIAN INTERPRETATION:  Left Ventricle: The left ventricular internal cavity size is moderately   increased. There is no left ventricular hypertrophy. Global LV systolic   function wasmoderately to severely decreased. Spectral Doppler shows   pseudonormal pattern of left ventricular myocardial filling (Grade II   diastolic dysfunction).  Right Ventricle: Normal right ventricular size and function. The right   ventricular size is normal. RV wall thickness is normal. RV systolic   function is normal. TV S' 0.1 m/s.  Left Atrium: Normal left atrial size.  Right Atrium: Normal right atrial size.  Pericardium: There is no evidence of pericardial effusion.  Mitral Valve: No evidenceof mitral stenosis. Mild mitral valve   regurgitation is seen.  Tricuspid Valve: Structurally normal tricuspid valve, with normal leaflet   excursion. No tricuspid regurgitation is visualized. Adequate TR velocity   was not obtained to accurately assess RVSP.  Aortic Valve: The aortic valve is trileaflet. No evidence of aortic   stenosis. No evidence of aortic valve regurgitation is seen.  Pulmonic Valve: The pulmonic valve was not well visualized. No indication   of pulmonic valve regurgitation.  Aorta: The aortic root and ascending aorta are structurally normal, with   no evidence of dilitation.  Venous: The inferior vena cava is normal. The inferior vena cava was   normal sized, with respiratory size variation less than 50%.    < end of copied text >        Assessment and Plan:

## 2025-03-24 NOTE — PROGRESS NOTE ADULT - PROVIDER SPECIALTY LIST ADULT
Cardiology
Internal Medicine
Cardiology
Internal Medicine
Cardiology

## 2025-03-24 NOTE — PROGRESS NOTE ADULT - ASSESSMENT
43 y/o f with no significant pmhx who presents complaining of a 4 day hx of orthopnea associated with chest tightness when taking deep breaths. endorses + FHx of CHF (Mother, Grandparent)    CT/PE: no evidence of PE, small bilateral pleural effusion  TTE 03/17/25: LVEF 25%, G2DD    # Acute HFrEF 25%   # NSVT  - trop WNL, ecg non-ischemic  - patient with  non ischemic cardiomyopathy, low EF 25% and recurrent NSVT, recommend wearable ICD on dc for SCD ppx  -  insurance auth for wearable ICD was denied, now going through appeals process, device rep to follow up  - telemetry reviewed this morning   - would increase MTP succinate to 25mg daily  - Her low BP prohibits use of ACE or ARNI at the current time.  - stable for discharge from cardiology's standpoint  - re-ECHO outpatient in 3 months  - outpatient follow up with cards, EP and CHF      Discussed with Dr Perez

## 2025-03-24 NOTE — PROGRESS NOTE ADULT - SUBJECTIVE AND OBJECTIVE BOX
SAMMIE DIANE 44y Female  MRN#: [id number]     SUBJECTIVE  Patient is a 44y old Female who presents with a chief complaint of Dyspnea (18 Mar 2025 11:12)  Currently admitted to medicine with the primary diagnosis of Pneumonia      OBJECTIVE  PAST MEDICAL & SURGICAL HISTORY  Fibroids    No significant past surgical history      ALLERGIES:  Vicodin (Unknown)  Nuts (Anaphylaxis)    MEDICATIONS:  STANDING MEDICATIONS  cefpodoxime 200 milliGRAM(s) Oral every 12 hours  enoxaparin Injectable 40 milliGRAM(s) SubCutaneous every 24 hours  folic acid 1 milliGRAM(s) Oral daily  furosemide    Tablet 40 milliGRAM(s) Oral daily  regadenoson Injectable 0.4 milliGRAM(s) IV Push once  thiamine 100 milliGRAM(s) Oral daily    PRN MEDICATIONS  acetaminophen     Tablet .. 650 milliGRAM(s) Oral every 6 hours PRN  benzonatate 100 milliGRAM(s) Oral every 8 hours PRN      T(C): 36.7 (24 Mar 2025 04:15), Max: 36.9 (23 Mar 2025 14:08)  T(F): 98.1 (24 Mar 2025 04:15), Max: 98.4 (23 Mar 2025 14:08)  HR: 89 (24 Mar 2025 04:15) (80 - 89)  BP: 112/76 (24 Mar 2025 04:15) (109/73 - 112/76)  RR: 18 (24 Mar 2025 04:15) (18 - 18)  SpO2: 99% (24 Mar 2025 04:15) (99% - 100%)    O2 Parameters below as of 23 Mar 2025 14:08  Patient On (Oxygen Delivery Method): room air                                        Lactate Trend            CAPILLARY BLOOD GLUCOSE            Culture Results:   No growth at 72 Hours (03-19 @ 19:19)  Culture Results:   Growth in aerobic bottle: Staphylococcus capitis  Isolation of Coagulase negative Staphylococcus from single blood culture  sets may represent  contamination. Contact the Microbiology Department at [phone] if  susceptibility testing is needed.  clinically indicated.  Direct identification is available within approximately 3-5  hours either by Blood Panel Multiplexed PCR or Direct  MALDI-TOF. Details: [email or web address] (03-16 @ 15:42)  Culture Results:   No growth at 5 days (03-16 @ 15:42)          Culture Results:   No growth at 24 hours (03-19 @ 19:19)  Culture Results:   Growth in aerobic bottle: Staphylococcus capitis  Isolation of Coagulase negative Staphylococcus from single blood culture  sets may represent  contamination. Contact the Microbiology Department at [phone] if  susceptibility testing is needed.  clinically indicated.  Direct identification is available within approximately 3-5  hours either by Blood Panel Multiplexed PCR or Direct  MALDI-TOF. Details: [email or web address] (03-16 @ 15:42)  Culture Results:   No growth at 4 days (03-16 @ 15:42)        Culture - Blood (collected 16 Mar 2025 15:42)  Source: Blood Blood-Peripheral  Preliminary Report (18 Mar 2025 22:01):    No growth at 48 Hours    Culture - Blood (collected 16 Mar 2025 15:42)  Source: Blood Blood-Peripheral  Gram Stain (18 Mar 2025 08:06):    Growth in aerobic bottle: Gram Positive Cocci in Clusters  Final Report (18 Mar 2025 22:29):    Growth in aerobic bottle: Staphylococcus capitis    Isolation of Coagulase negative Staphylococcus from single blood culture    sets may represent    contamination. Contact the Microbiology Department at [phone] if    susceptibility testing is needed.    clinically indicated.    Direct identification is available within approximately 3-5    hours either by Blood Panel Multiplexed PCR or Direct    MALDI-TOF. Details: [email or web address]  Organism: Blood Culture PCR (18 Mar 2025 22:29)  Organism: Blood Culture PCR (18 Mar 2025 22:29)          RADIOLOGY:      PHYSICAL EXAM:    GENERAL: NAD, well-developed  HEENT:  Atraumatic, Normocephalic  PULMONARY: Clear to auscultation bilaterally  CARDIOVASCULAR: Regular rate and rhythm  GASTROINTESTINAL: Soft, Nontender, Nondistended; Bowel sounds present  MUSCULOSKELETAL:  2+ Peripheral Pulses, No clubbing, cyanosis, or edema  NEUROLOGY: non-focal  SKIN: No rashes or lesions

## 2025-03-24 NOTE — DISCHARGE NOTE NURSING/CASE MANAGEMENT/SOCIAL WORK - FINANCIAL ASSISTANCE
[facility] provides services at a reduced cost to those who are determined to be eligible through [facility]’s financial assistance program. Information regarding [facility]’s financial assistance program can be found by going to [email or web address] or by calling [phone].

## 2025-03-24 NOTE — DISCHARGE NOTE NURSING/CASE MANAGEMENT/SOCIAL WORK - NSDCPEFALRISK_GEN_ALL_CORE
For information on Fall & Injury Prevention, visit: [email or web address] OR  [email or web address] OR  https://www.cdc.gov/steadi/patient.html

## 2025-03-24 NOTE — PROGRESS NOTE ADULT - ASSESSMENT
43 y/o F with no significant pmhx who presents complaining of a 4 day hx of orthopnea associated with chest pain while taking deep breaths, non productive cough and myalgias.    #New onset acute systolic congestive heart failure, reduced ejection fraction EF 25%  Cardiology following  telemetry   CTA- No evidence of pulmonary embolism. Bibasilar interlobular septal thickening and ground glass opacities most prominent in the left lower lobe. Small bilateral pleural effusions. Findings may reflect pulmonary edema  Lasix 40mg IVP QD  Strict Is and Os  Fluid restriction  Standing weights  TSH and free T4 within normal limits  Cardiac MRI reviewed  Possibly dilated cardiomyopathy due to ethanol use, though cardiac silhouette not enlarged on CT chest  Toprol XL 25mg Daily  Awaiting life vest    Echocardiogram 3/17/2025   1. Moderately to severely decreased global left ventricular systolic   function   2. LV Ejection Fraction by Veliz's Method with a biplane EF of 25 %.   3. Moderately increased left ventricular internal cavity size.   4. Spectral Doppler shows pseudonormal pattern of left ventricular   myocardial filling (Grade II diastolic dysfunction).   5. Normal right ventricular size and function.   6. Normal left atrial size.   7. Normal right atrial size.   8. Mild mitral valve regurgitation.   9. Adequate TR velocity was not obtained to accurately assess RVSP.  10. There is no evidence of pericardial effusion.    #Community acquired LLL pneumonia  #Gram positive bacteremia  Infectious disease consulted  As seen on CT chest  Blood cultures 3/16 1 of 2 growing coagulase negative staff by PCR  Blood cultures 3/18 ordered  strep pneumo and legionella pending  Ceftriaxone 1g QD  Azithromycin 500mg QD for 3 days  One dose of vancomycin ordered, but discontinued as MRSA nares negative    #Ethanol use  #Thiamine and folic acid deficiency  UDS negative  Monitor for etOH withdrawal symptoms  Thiamine 100mg daily and folic acid 1mg PO    CHG ordered  DVT prophylaxis: lovenox  Diet: DASH  Code status: Full  Pending life vest

## 2025-03-25 LAB
CULTURE RESULTS: SIGNIFICANT CHANGE UP
SPECIMEN SOURCE: SIGNIFICANT CHANGE UP

## 2025-03-26 ENCOUNTER — APPOINTMENT (OUTPATIENT)
Dept: ELECTROPHYSIOLOGY | Facility: CLINIC | Age: 45
End: 2025-03-26
Payer: COMMERCIAL

## 2025-03-26 VITALS
WEIGHT: 152 LBS | HEART RATE: 90 BPM | OXYGEN SATURATION: 100 % | HEIGHT: 67 IN | BODY MASS INDEX: 23.86 KG/M2 | SYSTOLIC BLOOD PRESSURE: 102 MMHG | DIASTOLIC BLOOD PRESSURE: 78 MMHG

## 2025-03-26 DIAGNOSIS — I50.20 UNSPECIFIED SYSTOLIC (CONGESTIVE) HEART FAILURE: ICD-10-CM

## 2025-03-26 DIAGNOSIS — I47.29 OTHER VENTRICULAR TACHYCARDIA: ICD-10-CM

## 2025-03-26 DIAGNOSIS — I42.0 DILATED CARDIOMYOPATHY: ICD-10-CM

## 2025-03-26 DIAGNOSIS — I42.9 CARDIOMYOPATHY, UNSPECIFIED: ICD-10-CM

## 2025-03-26 PROCEDURE — G2211 COMPLEX E/M VISIT ADD ON: CPT | Mod: NC

## 2025-03-26 PROCEDURE — 93000 ELECTROCARDIOGRAM COMPLETE: CPT

## 2025-03-26 PROCEDURE — 99205 OFFICE O/P NEW HI 60 MIN: CPT

## 2025-03-26 RX ORDER — METOPROLOL SUCCINATE 25 MG/1
25 TABLET, EXTENDED RELEASE ORAL DAILY
Qty: 30 | Refills: 3 | Status: ACTIVE | COMMUNITY
Start: 2025-03-26

## 2025-03-27 ENCOUNTER — APPOINTMENT (OUTPATIENT)
Dept: NUTRITION | Facility: CLINIC | Age: 45
End: 2025-03-27

## 2025-03-27 ENCOUNTER — OUTPATIENT (OUTPATIENT)
Dept: OUTPATIENT SERVICES | Facility: HOSPITAL | Age: 45
LOS: 1 days | End: 2025-03-27
Payer: COMMERCIAL

## 2025-03-27 DIAGNOSIS — Z71.3 DIETARY COUNSELING AND SURVEILLANCE: ICD-10-CM

## 2025-03-27 PROBLEM — I47.29 NSVT (NONSUSTAINED VENTRICULAR TACHYCARDIA): Status: ACTIVE | Noted: 2025-03-27

## 2025-03-27 PROBLEM — I50.20 HFREF (HEART FAILURE WITH REDUCED EJECTION FRACTION): Status: ACTIVE | Noted: 2025-03-27

## 2025-03-27 PROCEDURE — 97802 MEDICAL NUTRITION INDIV IN: CPT

## 2025-03-27 RX ORDER — FOLIC ACID 1 MG/1
1 TABLET ORAL
Refills: 0 | Status: ACTIVE | COMMUNITY

## 2025-03-27 RX ORDER — FUROSEMIDE 40 MG/1
40 TABLET ORAL
Refills: 0 | Status: ACTIVE | COMMUNITY

## 2025-03-31 DIAGNOSIS — I50.9 HEART FAILURE, UNSPECIFIED: ICD-10-CM

## 2025-04-02 DIAGNOSIS — I47.10 SUPRAVENTRICULAR TACHYCARDIA, UNSPECIFIED: ICD-10-CM

## 2025-04-02 DIAGNOSIS — I50.21 ACUTE SYSTOLIC (CONGESTIVE) HEART FAILURE: ICD-10-CM

## 2025-04-02 DIAGNOSIS — E51.9 THIAMINE DEFICIENCY, UNSPECIFIED: ICD-10-CM

## 2025-04-02 DIAGNOSIS — Z88.6 ALLERGY STATUS TO ANALGESIC AGENT: ICD-10-CM

## 2025-04-02 DIAGNOSIS — I42.8 OTHER CARDIOMYOPATHIES: ICD-10-CM

## 2025-04-02 DIAGNOSIS — J18.9 PNEUMONIA, UNSPECIFIED ORGANISM: ICD-10-CM

## 2025-04-02 DIAGNOSIS — Z91.018 ALLERGY TO OTHER FOODS: ICD-10-CM

## 2025-04-04 ENCOUNTER — NON-APPOINTMENT (OUTPATIENT)
Age: 45
End: 2025-04-04

## 2025-04-07 ENCOUNTER — APPOINTMENT (OUTPATIENT)
Dept: CARDIOLOGY | Facility: CLINIC | Age: 45
End: 2025-04-07
Payer: COMMERCIAL

## 2025-04-07 VITALS
DIASTOLIC BLOOD PRESSURE: 82 MMHG | HEIGHT: 67 IN | SYSTOLIC BLOOD PRESSURE: 107 MMHG | HEART RATE: 102 BPM | BODY MASS INDEX: 23.86 KG/M2 | WEIGHT: 152 LBS | OXYGEN SATURATION: 96 %

## 2025-04-07 DIAGNOSIS — I42.9 CARDIOMYOPATHY, UNSPECIFIED: ICD-10-CM

## 2025-04-07 PROCEDURE — 93000 ELECTROCARDIOGRAM COMPLETE: CPT

## 2025-04-07 PROCEDURE — 99214 OFFICE O/P EST MOD 30 MIN: CPT | Mod: 25

## 2025-04-09 ENCOUNTER — APPOINTMENT (OUTPATIENT)
Dept: ELECTROPHYSIOLOGY | Facility: CLINIC | Age: 45
End: 2025-04-09

## 2025-04-10 ENCOUNTER — APPOINTMENT (OUTPATIENT)
Facility: CLINIC | Age: 45
End: 2025-04-10
Payer: COMMERCIAL

## 2025-04-10 VITALS
BODY MASS INDEX: 23.07 KG/M2 | WEIGHT: 147 LBS | HEIGHT: 67 IN | DIASTOLIC BLOOD PRESSURE: 76 MMHG | SYSTOLIC BLOOD PRESSURE: 108 MMHG | HEART RATE: 96 BPM

## 2025-04-10 DIAGNOSIS — F10.10 ALCOHOL ABUSE, UNCOMPLICATED: ICD-10-CM

## 2025-04-10 DIAGNOSIS — Z00.00 ENCOUNTER FOR GENERAL ADULT MEDICAL EXAMINATION W/OUT ABNORMAL FINDINGS: ICD-10-CM

## 2025-04-10 DIAGNOSIS — I42.0 DILATED CARDIOMYOPATHY: ICD-10-CM

## 2025-04-10 DIAGNOSIS — I50.20 UNSPECIFIED SYSTOLIC (CONGESTIVE) HEART FAILURE: ICD-10-CM

## 2025-04-10 PROCEDURE — 99205 OFFICE O/P NEW HI 60 MIN: CPT | Mod: 25

## 2025-04-10 PROCEDURE — 93000 ELECTROCARDIOGRAM COMPLETE: CPT

## 2025-04-10 PROCEDURE — 99215 OFFICE O/P EST HI 40 MIN: CPT

## 2025-04-10 RX ORDER — SACUBITRIL AND VALSARTAN 24; 26 MG/1; MG/1
24-26 TABLET, FILM COATED ORAL TWICE DAILY
Qty: 180 | Refills: 3 | Status: ACTIVE | COMMUNITY
Start: 2025-04-10 | End: 1900-01-01

## 2025-04-15 RX ORDER — DAPAGLIFLOZIN 10 MG/1
10 TABLET, FILM COATED ORAL
Qty: 90 | Refills: 3 | Status: ACTIVE | COMMUNITY
Start: 2025-04-10 | End: 1900-01-01

## 2025-04-30 ENCOUNTER — APPOINTMENT (OUTPATIENT)
Age: 45
End: 2025-04-30

## 2025-04-30 ENCOUNTER — OUTPATIENT (OUTPATIENT)
Dept: OUTPATIENT SERVICES | Facility: HOSPITAL | Age: 45
LOS: 1 days | End: 2025-04-30
Payer: COMMERCIAL

## 2025-04-30 DIAGNOSIS — I50.20 UNSPECIFIED SYSTOLIC (CONGESTIVE) HEART FAILURE: ICD-10-CM

## 2025-04-30 PROCEDURE — 93798 PHYS/QHP OP CAR RHAB W/ECG: CPT

## 2025-05-01 ENCOUNTER — APPOINTMENT (OUTPATIENT)
Dept: NUTRITION | Facility: CLINIC | Age: 45
End: 2025-05-01

## 2025-05-01 DIAGNOSIS — I50.20 UNSPECIFIED SYSTOLIC (CONGESTIVE) HEART FAILURE: ICD-10-CM

## 2025-05-05 ENCOUNTER — APPOINTMENT (OUTPATIENT)
Age: 45
End: 2025-05-05

## 2025-05-05 ENCOUNTER — OUTPATIENT (OUTPATIENT)
Dept: OUTPATIENT SERVICES | Facility: HOSPITAL | Age: 45
LOS: 1 days | End: 2025-05-05
Payer: COMMERCIAL

## 2025-05-05 DIAGNOSIS — I50.20 UNSPECIFIED SYSTOLIC (CONGESTIVE) HEART FAILURE: ICD-10-CM

## 2025-05-05 PROCEDURE — 93798 PHYS/QHP OP CAR RHAB W/ECG: CPT

## 2025-05-06 DIAGNOSIS — I50.20 UNSPECIFIED SYSTOLIC (CONGESTIVE) HEART FAILURE: ICD-10-CM

## 2025-05-07 ENCOUNTER — APPOINTMENT (OUTPATIENT)
Age: 45
End: 2025-05-07

## 2025-05-07 ENCOUNTER — OUTPATIENT (OUTPATIENT)
Dept: OUTPATIENT SERVICES | Facility: HOSPITAL | Age: 45
LOS: 1 days | End: 2025-05-07

## 2025-05-07 DIAGNOSIS — I50.20 UNSPECIFIED SYSTOLIC (CONGESTIVE) HEART FAILURE: ICD-10-CM

## 2025-05-09 ENCOUNTER — APPOINTMENT (OUTPATIENT)
Age: 45
End: 2025-05-09

## 2025-05-09 ENCOUNTER — OUTPATIENT (OUTPATIENT)
Dept: OUTPATIENT SERVICES | Facility: HOSPITAL | Age: 45
LOS: 1 days | End: 2025-05-09

## 2025-05-09 DIAGNOSIS — I50.20 UNSPECIFIED SYSTOLIC (CONGESTIVE) HEART FAILURE: ICD-10-CM

## 2025-05-12 ENCOUNTER — OUTPATIENT (OUTPATIENT)
Dept: OUTPATIENT SERVICES | Facility: HOSPITAL | Age: 45
LOS: 1 days | End: 2025-05-12

## 2025-05-12 ENCOUNTER — APPOINTMENT (OUTPATIENT)
Age: 45
End: 2025-05-12

## 2025-05-12 DIAGNOSIS — I50.20 UNSPECIFIED SYSTOLIC (CONGESTIVE) HEART FAILURE: ICD-10-CM

## 2025-05-14 ENCOUNTER — APPOINTMENT (OUTPATIENT)
Age: 45
End: 2025-05-14

## 2025-05-14 ENCOUNTER — OUTPATIENT (OUTPATIENT)
Dept: OUTPATIENT SERVICES | Facility: HOSPITAL | Age: 45
LOS: 1 days | End: 2025-05-14

## 2025-05-14 DIAGNOSIS — I50.20 UNSPECIFIED SYSTOLIC (CONGESTIVE) HEART FAILURE: ICD-10-CM

## 2025-05-16 ENCOUNTER — APPOINTMENT (OUTPATIENT)
Age: 45
End: 2025-05-16

## 2025-05-16 ENCOUNTER — OUTPATIENT (OUTPATIENT)
Dept: OUTPATIENT SERVICES | Facility: HOSPITAL | Age: 45
LOS: 1 days | End: 2025-05-16

## 2025-05-16 DIAGNOSIS — I50.20 UNSPECIFIED SYSTOLIC (CONGESTIVE) HEART FAILURE: ICD-10-CM

## 2025-05-19 ENCOUNTER — APPOINTMENT (OUTPATIENT)
Age: 45
End: 2025-05-19

## 2025-05-19 ENCOUNTER — OUTPATIENT (OUTPATIENT)
Dept: OUTPATIENT SERVICES | Facility: HOSPITAL | Age: 45
LOS: 1 days | End: 2025-05-19

## 2025-05-19 DIAGNOSIS — I50.20 UNSPECIFIED SYSTOLIC (CONGESTIVE) HEART FAILURE: ICD-10-CM

## 2025-05-21 ENCOUNTER — OUTPATIENT (OUTPATIENT)
Dept: OUTPATIENT SERVICES | Facility: HOSPITAL | Age: 45
LOS: 1 days | End: 2025-05-21

## 2025-05-21 ENCOUNTER — APPOINTMENT (OUTPATIENT)
Age: 45
End: 2025-05-21

## 2025-05-21 DIAGNOSIS — I50.20 UNSPECIFIED SYSTOLIC (CONGESTIVE) HEART FAILURE: ICD-10-CM

## 2025-05-23 ENCOUNTER — OUTPATIENT (OUTPATIENT)
Dept: OUTPATIENT SERVICES | Facility: HOSPITAL | Age: 45
LOS: 1 days | End: 2025-05-23

## 2025-05-23 ENCOUNTER — APPOINTMENT (OUTPATIENT)
Age: 45
End: 2025-05-23

## 2025-05-23 DIAGNOSIS — I50.20 UNSPECIFIED SYSTOLIC (CONGESTIVE) HEART FAILURE: ICD-10-CM

## 2025-05-28 ENCOUNTER — APPOINTMENT (OUTPATIENT)
Dept: HEART FAILURE | Facility: CLINIC | Age: 45
End: 2025-05-28
Payer: COMMERCIAL

## 2025-05-28 ENCOUNTER — APPOINTMENT (OUTPATIENT)
Age: 45
End: 2025-05-28

## 2025-05-28 VITALS
WEIGHT: 146 LBS | DIASTOLIC BLOOD PRESSURE: 68 MMHG | HEART RATE: 87 BPM | OXYGEN SATURATION: 98 % | BODY MASS INDEX: 22.91 KG/M2 | SYSTOLIC BLOOD PRESSURE: 110 MMHG | HEIGHT: 67 IN

## 2025-05-28 DIAGNOSIS — I42.0 DILATED CARDIOMYOPATHY: ICD-10-CM

## 2025-05-28 DIAGNOSIS — I50.20 UNSPECIFIED SYSTOLIC (CONGESTIVE) HEART FAILURE: ICD-10-CM

## 2025-05-28 PROCEDURE — 99215 OFFICE O/P EST HI 40 MIN: CPT | Mod: 25

## 2025-05-28 PROCEDURE — 93000 ELECTROCARDIOGRAM COMPLETE: CPT

## 2025-05-28 RX ORDER — CHOLECALCIFEROL (VITAMIN D3) 125 MCG
125 MCG CAPSULE ORAL
Refills: 0 | Status: ACTIVE | COMMUNITY

## 2025-05-28 RX ORDER — SPIRONOLACTONE 25 MG/1
25 TABLET ORAL DAILY
Qty: 90 | Refills: 3 | Status: ACTIVE | COMMUNITY
Start: 2025-05-28 | End: 1900-01-01

## 2025-05-30 ENCOUNTER — APPOINTMENT (OUTPATIENT)
Age: 45
End: 2025-05-30

## 2025-05-30 ENCOUNTER — OUTPATIENT (OUTPATIENT)
Dept: OUTPATIENT SERVICES | Facility: HOSPITAL | Age: 45
LOS: 1 days | End: 2025-05-30

## 2025-05-30 DIAGNOSIS — I50.20 UNSPECIFIED SYSTOLIC (CONGESTIVE) HEART FAILURE: ICD-10-CM

## 2025-06-02 ENCOUNTER — OUTPATIENT (OUTPATIENT)
Dept: OUTPATIENT SERVICES | Facility: HOSPITAL | Age: 45
LOS: 1 days | End: 2025-06-02
Payer: COMMERCIAL

## 2025-06-02 ENCOUNTER — APPOINTMENT (OUTPATIENT)
Age: 45
End: 2025-06-02

## 2025-06-02 DIAGNOSIS — I50.20 UNSPECIFIED SYSTOLIC (CONGESTIVE) HEART FAILURE: ICD-10-CM

## 2025-06-02 PROCEDURE — 93798 PHYS/QHP OP CAR RHAB W/ECG: CPT

## 2025-06-04 ENCOUNTER — APPOINTMENT (OUTPATIENT)
Age: 45
End: 2025-06-04

## 2025-06-04 ENCOUNTER — OUTPATIENT (OUTPATIENT)
Dept: OUTPATIENT SERVICES | Facility: HOSPITAL | Age: 45
LOS: 1 days | End: 2025-06-04

## 2025-06-04 DIAGNOSIS — I50.20 UNSPECIFIED SYSTOLIC (CONGESTIVE) HEART FAILURE: ICD-10-CM

## 2025-06-06 ENCOUNTER — APPOINTMENT (OUTPATIENT)
Age: 45
End: 2025-06-06

## 2025-06-06 ENCOUNTER — OUTPATIENT (OUTPATIENT)
Dept: OUTPATIENT SERVICES | Facility: HOSPITAL | Age: 45
LOS: 1 days | End: 2025-06-06

## 2025-06-06 DIAGNOSIS — I50.20 UNSPECIFIED SYSTOLIC (CONGESTIVE) HEART FAILURE: ICD-10-CM

## 2025-06-09 ENCOUNTER — APPOINTMENT (OUTPATIENT)
Age: 45
End: 2025-06-09

## 2025-06-09 ENCOUNTER — OUTPATIENT (OUTPATIENT)
Dept: OUTPATIENT SERVICES | Facility: HOSPITAL | Age: 45
LOS: 1 days | End: 2025-06-09

## 2025-06-09 DIAGNOSIS — I50.20 UNSPECIFIED SYSTOLIC (CONGESTIVE) HEART FAILURE: ICD-10-CM

## 2025-06-11 ENCOUNTER — OUTPATIENT (OUTPATIENT)
Dept: OUTPATIENT SERVICES | Facility: HOSPITAL | Age: 45
LOS: 1 days | End: 2025-06-11

## 2025-06-11 ENCOUNTER — APPOINTMENT (OUTPATIENT)
Age: 45
End: 2025-06-11

## 2025-06-11 DIAGNOSIS — I50.20 UNSPECIFIED SYSTOLIC (CONGESTIVE) HEART FAILURE: ICD-10-CM

## 2025-06-13 ENCOUNTER — APPOINTMENT (OUTPATIENT)
Dept: CARDIOLOGY | Facility: CLINIC | Age: 45
End: 2025-06-13
Payer: COMMERCIAL

## 2025-06-13 ENCOUNTER — NON-APPOINTMENT (OUTPATIENT)
Age: 45
End: 2025-06-13

## 2025-06-13 ENCOUNTER — APPOINTMENT (OUTPATIENT)
Age: 45
End: 2025-06-13

## 2025-06-13 VITALS
SYSTOLIC BLOOD PRESSURE: 98 MMHG | OXYGEN SATURATION: 99 % | HEART RATE: 83 BPM | WEIGHT: 139 LBS | DIASTOLIC BLOOD PRESSURE: 70 MMHG | HEIGHT: 67 IN | BODY MASS INDEX: 21.82 KG/M2

## 2025-06-13 PROCEDURE — 93000 ELECTROCARDIOGRAM COMPLETE: CPT

## 2025-06-13 PROCEDURE — 99214 OFFICE O/P EST MOD 30 MIN: CPT | Mod: 25

## 2025-06-16 ENCOUNTER — APPOINTMENT (OUTPATIENT)
Age: 45
End: 2025-06-16

## 2025-06-16 ENCOUNTER — OUTPATIENT (OUTPATIENT)
Dept: OUTPATIENT SERVICES | Facility: HOSPITAL | Age: 45
LOS: 1 days | End: 2025-06-16

## 2025-06-16 DIAGNOSIS — I50.20 UNSPECIFIED SYSTOLIC (CONGESTIVE) HEART FAILURE: ICD-10-CM

## 2025-06-17 ENCOUNTER — OUTPATIENT (OUTPATIENT)
Dept: OUTPATIENT SERVICES | Facility: HOSPITAL | Age: 45
LOS: 1 days | End: 2025-06-17
Payer: COMMERCIAL

## 2025-06-17 ENCOUNTER — RESULT REVIEW (OUTPATIENT)
Age: 45
End: 2025-06-17

## 2025-06-17 ENCOUNTER — APPOINTMENT (OUTPATIENT)
Dept: CV DIAGNOSITCS | Facility: HOSPITAL | Age: 45
End: 2025-06-17
Payer: COMMERCIAL

## 2025-06-17 DIAGNOSIS — I10 ESSENTIAL (PRIMARY) HYPERTENSION: ICD-10-CM

## 2025-06-17 DIAGNOSIS — I42.0 DILATED CARDIOMYOPATHY: ICD-10-CM

## 2025-06-17 DIAGNOSIS — I42.9 CARDIOMYOPATHY, UNSPECIFIED: ICD-10-CM

## 2025-06-17 PROCEDURE — 93306 TTE W/DOPPLER COMPLETE: CPT | Mod: 26

## 2025-06-17 PROCEDURE — 93306 TTE W/DOPPLER COMPLETE: CPT

## 2025-06-18 ENCOUNTER — APPOINTMENT (OUTPATIENT)
Age: 45
End: 2025-06-18

## 2025-06-18 DIAGNOSIS — I42.9 CARDIOMYOPATHY, UNSPECIFIED: ICD-10-CM

## 2025-06-18 DIAGNOSIS — I42.0 DILATED CARDIOMYOPATHY: ICD-10-CM

## 2025-06-20 ENCOUNTER — APPOINTMENT (OUTPATIENT)
Age: 45
End: 2025-06-20

## 2025-06-23 ENCOUNTER — APPOINTMENT (OUTPATIENT)
Age: 45
End: 2025-06-23

## 2025-06-25 ENCOUNTER — APPOINTMENT (OUTPATIENT)
Age: 45
End: 2025-06-25

## 2025-06-25 ENCOUNTER — APPOINTMENT (OUTPATIENT)
Dept: ELECTROPHYSIOLOGY | Facility: CLINIC | Age: 45
End: 2025-06-25
Payer: COMMERCIAL

## 2025-06-25 VITALS
DIASTOLIC BLOOD PRESSURE: 68 MMHG | BODY MASS INDEX: 21.82 KG/M2 | WEIGHT: 139 LBS | HEIGHT: 67 IN | SYSTOLIC BLOOD PRESSURE: 108 MMHG | HEART RATE: 80 BPM | OXYGEN SATURATION: 100 %

## 2025-06-25 PROCEDURE — G2211 COMPLEX E/M VISIT ADD ON: CPT | Mod: NC

## 2025-06-25 PROCEDURE — 99215 OFFICE O/P EST HI 40 MIN: CPT

## 2025-06-25 PROCEDURE — 93000 ELECTROCARDIOGRAM COMPLETE: CPT

## 2025-06-27 ENCOUNTER — NON-APPOINTMENT (OUTPATIENT)
Age: 45
End: 2025-06-27

## 2025-06-27 ENCOUNTER — APPOINTMENT (OUTPATIENT)
Age: 45
End: 2025-06-27

## 2025-06-30 ENCOUNTER — APPOINTMENT (OUTPATIENT)
Dept: CARDIOLOGY | Facility: CLINIC | Age: 45
End: 2025-06-30
Payer: COMMERCIAL

## 2025-06-30 ENCOUNTER — APPOINTMENT (OUTPATIENT)
Age: 45
End: 2025-06-30

## 2025-06-30 VITALS
WEIGHT: 139 LBS | HEIGHT: 67 IN | DIASTOLIC BLOOD PRESSURE: 64 MMHG | OXYGEN SATURATION: 100 % | SYSTOLIC BLOOD PRESSURE: 106 MMHG | HEART RATE: 91 BPM | BODY MASS INDEX: 21.82 KG/M2

## 2025-06-30 PROCEDURE — 99213 OFFICE O/P EST LOW 20 MIN: CPT

## 2025-07-02 ENCOUNTER — APPOINTMENT (OUTPATIENT)
Age: 45
End: 2025-07-02

## 2025-07-02 ENCOUNTER — NON-APPOINTMENT (OUTPATIENT)
Age: 45
End: 2025-07-02

## 2025-07-07 ENCOUNTER — APPOINTMENT (OUTPATIENT)
Age: 45
End: 2025-07-07

## 2025-07-09 ENCOUNTER — APPOINTMENT (OUTPATIENT)
Age: 45
End: 2025-07-09

## 2025-07-11 ENCOUNTER — APPOINTMENT (OUTPATIENT)
Age: 45
End: 2025-07-11

## 2025-07-14 ENCOUNTER — APPOINTMENT (OUTPATIENT)
Dept: HEART FAILURE | Facility: CLINIC | Age: 45
End: 2025-07-14
Payer: COMMERCIAL

## 2025-07-14 ENCOUNTER — APPOINTMENT (OUTPATIENT)
Age: 45
End: 2025-07-14

## 2025-07-14 VITALS
OXYGEN SATURATION: 98 % | HEIGHT: 67 IN | WEIGHT: 140 LBS | SYSTOLIC BLOOD PRESSURE: 98 MMHG | BODY MASS INDEX: 21.97 KG/M2 | HEART RATE: 96 BPM | DIASTOLIC BLOOD PRESSURE: 70 MMHG

## 2025-07-14 PROBLEM — R06.02 SHORTNESS OF BREATH ON EXERTION: Status: ACTIVE | Noted: 2025-07-14

## 2025-07-14 PROCEDURE — 99215 OFFICE O/P EST HI 40 MIN: CPT | Mod: 25

## 2025-07-14 PROCEDURE — 93000 ELECTROCARDIOGRAM COMPLETE: CPT

## 2025-07-16 ENCOUNTER — APPOINTMENT (OUTPATIENT)
Age: 45
End: 2025-07-16

## 2025-07-18 ENCOUNTER — APPOINTMENT (OUTPATIENT)
Age: 45
End: 2025-07-18

## 2025-07-21 ENCOUNTER — APPOINTMENT (OUTPATIENT)
Age: 45
End: 2025-07-21

## 2025-07-23 ENCOUNTER — APPOINTMENT (OUTPATIENT)
Age: 45
End: 2025-07-23

## 2025-07-25 ENCOUNTER — APPOINTMENT (OUTPATIENT)
Age: 45
End: 2025-07-25

## 2025-07-28 ENCOUNTER — APPOINTMENT (OUTPATIENT)
Age: 45
End: 2025-07-28

## 2025-07-30 ENCOUNTER — APPOINTMENT (OUTPATIENT)
Age: 45
End: 2025-07-30

## 2025-08-06 ENCOUNTER — APPOINTMENT (OUTPATIENT)
Age: 45
End: 2025-08-06

## 2025-08-06 ENCOUNTER — OUTPATIENT (OUTPATIENT)
Dept: OUTPATIENT SERVICES | Facility: HOSPITAL | Age: 45
LOS: 1 days | Discharge: HOME | End: 2025-08-06
Payer: COMMERCIAL

## 2025-08-06 DIAGNOSIS — I50.20 UNSPECIFIED SYSTOLIC (CONGESTIVE) HEART FAILURE: ICD-10-CM

## 2025-08-06 PROCEDURE — 93798 PHYS/QHP OP CAR RHAB W/ECG: CPT

## 2025-08-07 DIAGNOSIS — I50.20 UNSPECIFIED SYSTOLIC (CONGESTIVE) HEART FAILURE: ICD-10-CM

## 2025-08-08 ENCOUNTER — OUTPATIENT (OUTPATIENT)
Dept: OUTPATIENT SERVICES | Facility: HOSPITAL | Age: 45
LOS: 1 days | Discharge: HOME | End: 2025-08-08

## 2025-08-08 ENCOUNTER — APPOINTMENT (OUTPATIENT)
Age: 45
End: 2025-08-08

## 2025-08-08 DIAGNOSIS — I50.20 UNSPECIFIED SYSTOLIC (CONGESTIVE) HEART FAILURE: ICD-10-CM

## 2025-08-11 ENCOUNTER — APPOINTMENT (OUTPATIENT)
Age: 45
End: 2025-08-11

## 2025-08-11 ENCOUNTER — OUTPATIENT (OUTPATIENT)
Dept: OUTPATIENT SERVICES | Facility: HOSPITAL | Age: 45
LOS: 1 days | Discharge: HOME | End: 2025-08-11

## 2025-08-11 DIAGNOSIS — I50.20 UNSPECIFIED SYSTOLIC (CONGESTIVE) HEART FAILURE: ICD-10-CM

## 2025-08-13 ENCOUNTER — APPOINTMENT (OUTPATIENT)
Age: 45
End: 2025-08-13

## 2025-08-13 ENCOUNTER — OUTPATIENT (OUTPATIENT)
Dept: OUTPATIENT SERVICES | Facility: HOSPITAL | Age: 45
LOS: 1 days | Discharge: HOME | End: 2025-08-13

## 2025-08-13 DIAGNOSIS — I50.20 UNSPECIFIED SYSTOLIC (CONGESTIVE) HEART FAILURE: ICD-10-CM

## 2025-08-15 ENCOUNTER — OUTPATIENT (OUTPATIENT)
Dept: OUTPATIENT SERVICES | Facility: HOSPITAL | Age: 45
LOS: 1 days | Discharge: HOME | End: 2025-08-15

## 2025-08-15 ENCOUNTER — APPOINTMENT (OUTPATIENT)
Age: 45
End: 2025-08-15

## 2025-08-15 DIAGNOSIS — I50.20 UNSPECIFIED SYSTOLIC (CONGESTIVE) HEART FAILURE: ICD-10-CM

## 2025-08-18 ENCOUNTER — OUTPATIENT (OUTPATIENT)
Dept: OUTPATIENT SERVICES | Facility: HOSPITAL | Age: 45
LOS: 1 days | Discharge: HOME | End: 2025-08-18

## 2025-08-18 ENCOUNTER — APPOINTMENT (OUTPATIENT)
Age: 45
End: 2025-08-18

## 2025-08-18 DIAGNOSIS — I50.20 UNSPECIFIED SYSTOLIC (CONGESTIVE) HEART FAILURE: ICD-10-CM

## 2025-08-20 ENCOUNTER — APPOINTMENT (OUTPATIENT)
Age: 45
End: 2025-08-20

## 2025-08-22 ENCOUNTER — OUTPATIENT (OUTPATIENT)
Dept: OUTPATIENT SERVICES | Facility: HOSPITAL | Age: 45
LOS: 1 days | Discharge: HOME | End: 2025-08-22

## 2025-08-22 ENCOUNTER — APPOINTMENT (OUTPATIENT)
Age: 45
End: 2025-08-22

## 2025-08-22 DIAGNOSIS — I50.20 UNSPECIFIED SYSTOLIC (CONGESTIVE) HEART FAILURE: ICD-10-CM

## 2025-08-25 ENCOUNTER — APPOINTMENT (OUTPATIENT)
Age: 45
End: 2025-08-25

## 2025-08-27 ENCOUNTER — APPOINTMENT (OUTPATIENT)
Age: 45
End: 2025-08-27

## 2025-08-29 ENCOUNTER — APPOINTMENT (OUTPATIENT)
Age: 45
End: 2025-08-29

## 2025-09-08 ENCOUNTER — APPOINTMENT (OUTPATIENT)
Dept: CV DIAGNOSITCS | Facility: HOSPITAL | Age: 45
End: 2025-09-08
Payer: COMMERCIAL

## 2025-09-08 ENCOUNTER — RESULT REVIEW (OUTPATIENT)
Age: 45
End: 2025-09-08

## 2025-09-08 PROCEDURE — 93306 TTE W/DOPPLER COMPLETE: CPT | Mod: 26

## 2025-09-12 ENCOUNTER — RX RENEWAL (OUTPATIENT)
Age: 45
End: 2025-09-12

## 2025-09-15 ENCOUNTER — APPOINTMENT (OUTPATIENT)
Dept: CARDIOLOGY | Facility: CLINIC | Age: 45
End: 2025-09-15

## 2025-09-15 VITALS
DIASTOLIC BLOOD PRESSURE: 71 MMHG | OXYGEN SATURATION: 100 % | HEIGHT: 67 IN | SYSTOLIC BLOOD PRESSURE: 102 MMHG | BODY MASS INDEX: 21.97 KG/M2 | WEIGHT: 140 LBS | HEART RATE: 75 BPM

## 2025-09-15 DIAGNOSIS — I42.0 DILATED CARDIOMYOPATHY: ICD-10-CM

## 2025-09-15 DIAGNOSIS — I47.29 OTHER VENTRICULAR TACHYCARDIA: ICD-10-CM

## 2025-09-15 DIAGNOSIS — I50.20 UNSPECIFIED SYSTOLIC (CONGESTIVE) HEART FAILURE: ICD-10-CM
